# Patient Record
Sex: FEMALE | Race: WHITE | NOT HISPANIC OR LATINO | Employment: OTHER | URBAN - NONMETROPOLITAN AREA
[De-identification: names, ages, dates, MRNs, and addresses within clinical notes are randomized per-mention and may not be internally consistent; named-entity substitution may affect disease eponyms.]

---

## 2017-02-18 ENCOUNTER — HOSPITAL ENCOUNTER (EMERGENCY)
Facility: HOSPITAL | Age: 62
Discharge: STILL ADMITTED OR ADMINISTRATIVE | End: 2017-02-18
Attending: EMERGENCY MEDICINE | Admitting: EMERGENCY MEDICINE

## 2017-02-18 ENCOUNTER — HOSPITAL ENCOUNTER (INPATIENT)
Facility: HOSPITAL | Age: 62
LOS: 5 days | Discharge: HOME | End: 2017-02-23
Attending: PSYCHIATRY & NEUROLOGY | Admitting: PSYCHIATRY & NEUROLOGY

## 2017-02-18 VITALS
OXYGEN SATURATION: 97 % | DIASTOLIC BLOOD PRESSURE: 100 MMHG | BODY MASS INDEX: 39.56 KG/M2 | WEIGHT: 261 LBS | HEART RATE: 84 BPM | TEMPERATURE: 97.9 F | RESPIRATION RATE: 18 BRPM | SYSTOLIC BLOOD PRESSURE: 153 MMHG | HEIGHT: 68 IN

## 2017-02-18 DIAGNOSIS — R45.851 SUICIDAL IDEATION: ICD-10-CM

## 2017-02-18 DIAGNOSIS — F32.A DEPRESSION, UNSPECIFIED DEPRESSION TYPE: Primary | ICD-10-CM

## 2017-02-18 PROBLEM — F32.9 MDD (MAJOR DEPRESSIVE DISORDER): Status: ACTIVE | Noted: 2017-02-18

## 2017-02-18 LAB
ALBUMIN SERPL-MCNC: 5.1 G/DL (ref 3.4–4.8)
ALBUMIN/GLOB SERPL: 1.4 G/DL (ref 1.5–2.5)
ALP SERPL-CCNC: 98 U/L (ref 35–104)
ALT SERPL W P-5'-P-CCNC: 30 U/L (ref 10–36)
AMPHET+METHAMPHET UR QL: NEGATIVE
ANION GAP SERPL CALCULATED.3IONS-SCNC: 9.4 MMOL/L (ref 3.6–11.2)
AST SERPL-CCNC: 23 U/L (ref 10–30)
BACTERIA UR QL AUTO: ABNORMAL /HPF
BARBITURATES UR QL SCN: NEGATIVE
BASOPHILS # BLD AUTO: 0.07 10*3/MM3 (ref 0–0.3)
BASOPHILS NFR BLD AUTO: 0.4 % (ref 0–2)
BENZODIAZ UR QL SCN: POSITIVE
BILIRUB SERPL-MCNC: 0.9 MG/DL (ref 0.2–1.8)
BILIRUB UR QL STRIP: ABNORMAL
BUN BLD-MCNC: 12 MG/DL (ref 7–21)
BUN/CREAT SERPL: 21.8 (ref 7–25)
BUPRENORPHINE+NOR UR QL SCN: NEGATIVE
CALCIUM SPEC-SCNC: 10 MG/DL (ref 7.7–10)
CANNABINOIDS SERPL QL: NEGATIVE
CHLORIDE SERPL-SCNC: 103 MMOL/L (ref 99–112)
CLARITY UR: ABNORMAL
CO2 SERPL-SCNC: 25.6 MMOL/L (ref 24.3–31.9)
COCAINE UR QL: NEGATIVE
COD CRY URNS QL: ABNORMAL /HPF
COLOR UR: ABNORMAL
CREAT BLD-MCNC: 0.55 MG/DL (ref 0.43–1.29)
DEPRECATED RDW RBC AUTO: 46.5 FL (ref 37–54)
EOSINOPHIL # BLD AUTO: 0.26 10*3/MM3 (ref 0–0.7)
EOSINOPHIL NFR BLD AUTO: 1.6 % (ref 0–5)
ERYTHROCYTE [DISTWIDTH] IN BLOOD BY AUTOMATED COUNT: 14.7 % (ref 11.5–14.5)
ETHANOL BLD-MCNC: <10 MG/DL
ETHANOL UR QL: <0.01 %
GFR SERPL CREATININE-BSD FRML MDRD: 112 ML/MIN/1.73
GLOBULIN UR ELPH-MCNC: 3.7 GM/DL
GLUCOSE BLD-MCNC: 115 MG/DL (ref 70–110)
GLUCOSE UR STRIP-MCNC: NEGATIVE MG/DL
HCT VFR BLD AUTO: 45.1 % (ref 37–47)
HGB BLD-MCNC: 14.6 G/DL (ref 12–16)
HGB UR QL STRIP.AUTO: NEGATIVE
HYALINE CASTS UR QL AUTO: ABNORMAL /LPF
IMM GRANULOCYTES # BLD: 0.04 10*3/MM3 (ref 0–0.03)
IMM GRANULOCYTES NFR BLD: 0.2 % (ref 0–0.5)
KETONES UR QL STRIP: ABNORMAL
LEUKOCYTE ESTERASE UR QL STRIP.AUTO: ABNORMAL
LYMPHOCYTES # BLD AUTO: 3.54 10*3/MM3 (ref 1–3)
LYMPHOCYTES NFR BLD AUTO: 21.4 % (ref 21–51)
MCH RBC QN AUTO: 28.3 PG (ref 27–33)
MCHC RBC AUTO-ENTMCNC: 32.4 G/DL (ref 33–37)
MCV RBC AUTO: 87.6 FL (ref 80–94)
METHADONE UR QL SCN: NEGATIVE
MONOCYTES # BLD AUTO: 1.59 10*3/MM3 (ref 0.1–0.9)
MONOCYTES NFR BLD AUTO: 9.6 % (ref 0–10)
NEUTROPHILS # BLD AUTO: 11.01 10*3/MM3 (ref 1.4–6.5)
NEUTROPHILS NFR BLD AUTO: 66.8 % (ref 30–70)
NITRITE UR QL STRIP: NEGATIVE
OPIATES UR QL: NEGATIVE
OSMOLALITY SERPL CALC.SUM OF ELEC: 276.4 MOSM/KG (ref 273–305)
OXYCODONE UR QL SCN: NEGATIVE
PCP UR QL SCN: NEGATIVE
PH UR STRIP.AUTO: 5.5 [PH] (ref 5–8)
PLATELET # BLD AUTO: 457 10*3/MM3 (ref 130–400)
PMV BLD AUTO: 12 FL (ref 6–10)
POTASSIUM BLD-SCNC: 3.5 MMOL/L (ref 3.5–5.3)
PROPOXYPH UR QL: NEGATIVE
PROT SERPL-MCNC: 8.8 G/DL (ref 6–8)
PROT UR QL STRIP: ABNORMAL
RBC # BLD AUTO: 5.15 10*6/MM3 (ref 4.2–5.4)
RBC # UR: ABNORMAL /HPF
REF LAB TEST METHOD: ABNORMAL
SODIUM BLD-SCNC: 138 MMOL/L (ref 135–153)
SP GR UR STRIP: >1.03 (ref 1–1.03)
SQUAMOUS #/AREA URNS HPF: ABNORMAL /HPF
UROBILINOGEN UR QL STRIP: ABNORMAL
WBC NRBC COR # BLD: 16.51 10*3/MM3 (ref 4.5–12.5)
WBC UR QL AUTO: ABNORMAL /HPF

## 2017-02-18 RX ORDER — TRAZODONE HYDROCHLORIDE 50 MG/1
100 TABLET ORAL NIGHTLY PRN
Status: DISCONTINUED | OUTPATIENT
Start: 2017-02-18 | End: 2017-02-20

## 2017-02-18 RX ORDER — AMLODIPINE BESYLATE 10 MG/1
10 TABLET ORAL DAILY
Status: DISCONTINUED | OUTPATIENT
Start: 2017-02-19 | End: 2017-02-23 | Stop reason: HOSPADM

## 2017-02-18 RX ORDER — BENZTROPINE MESYLATE 1 MG/1
2 TABLET ORAL AS NEEDED
Status: DISCONTINUED | OUTPATIENT
Start: 2017-02-18 | End: 2017-02-23 | Stop reason: HOSPADM

## 2017-02-18 RX ORDER — METOPROLOL TARTRATE 100 MG/1
100 TABLET ORAL EVERY 12 HOURS SCHEDULED
Status: DISCONTINUED | OUTPATIENT
Start: 2017-02-19 | End: 2017-02-18 | Stop reason: SDUPTHER

## 2017-02-18 RX ORDER — LISINOPRIL 10 MG/1
20 TABLET ORAL DAILY
Status: CANCELLED | OUTPATIENT
Start: 2017-02-19

## 2017-02-18 RX ORDER — DICLOFENAC SODIUM 75 MG/1
75 TABLET, DELAYED RELEASE ORAL 2 TIMES DAILY
COMMUNITY

## 2017-02-18 RX ORDER — ONDANSETRON 4 MG/1
4 TABLET, FILM COATED ORAL EVERY 6 HOURS PRN
Status: DISCONTINUED | OUTPATIENT
Start: 2017-02-18 | End: 2017-02-23 | Stop reason: HOSPADM

## 2017-02-18 RX ORDER — NAPROXEN 250 MG/1
500 TABLET ORAL 2 TIMES DAILY WITH MEALS
Status: CANCELLED | OUTPATIENT
Start: 2017-02-18

## 2017-02-18 RX ORDER — NAPROXEN 250 MG/1
500 TABLET ORAL 2 TIMES DAILY WITH MEALS
Status: DISCONTINUED | OUTPATIENT
Start: 2017-02-19 | End: 2017-02-18 | Stop reason: SDUPTHER

## 2017-02-18 RX ORDER — LOPERAMIDE HYDROCHLORIDE 2 MG/1
2 CAPSULE ORAL 4 TIMES DAILY PRN
Status: DISCONTINUED | OUTPATIENT
Start: 2017-02-18 | End: 2017-02-23 | Stop reason: HOSPADM

## 2017-02-18 RX ORDER — ACETAMINOPHEN 325 MG/1
650 TABLET ORAL EVERY 6 HOURS PRN
Status: DISCONTINUED | OUTPATIENT
Start: 2017-02-18 | End: 2017-02-23 | Stop reason: HOSPADM

## 2017-02-18 RX ORDER — VENLAFAXINE 37.5 MG/1
75 TABLET ORAL 2 TIMES DAILY
Status: DISCONTINUED | OUTPATIENT
Start: 2017-02-19 | End: 2017-02-23 | Stop reason: HOSPADM

## 2017-02-18 RX ORDER — FLUTICASONE PROPIONATE 50 MCG
1 SPRAY, SUSPENSION (ML) NASAL DAILY
Status: CANCELLED | OUTPATIENT
Start: 2017-02-19

## 2017-02-18 RX ORDER — NAPROXEN 250 MG/1
500 TABLET ORAL 2 TIMES DAILY WITH MEALS
Status: DISCONTINUED | OUTPATIENT
Start: 2017-02-19 | End: 2017-02-23 | Stop reason: HOSPADM

## 2017-02-18 RX ORDER — METOPROLOL TARTRATE 50 MG/1
100 TABLET, FILM COATED ORAL 2 TIMES DAILY
Status: CANCELLED | OUTPATIENT
Start: 2017-02-18

## 2017-02-18 RX ORDER — AMLODIPINE BESYLATE 5 MG/1
10 TABLET ORAL DAILY
Status: CANCELLED | OUTPATIENT
Start: 2017-02-19

## 2017-02-18 RX ORDER — LISINOPRIL 20 MG/1
20 TABLET ORAL DAILY
COMMUNITY
End: 2020-06-30 | Stop reason: SDUPTHER

## 2017-02-18 RX ORDER — HYDROXYZINE 50 MG/1
50 TABLET, FILM COATED ORAL EVERY 6 HOURS PRN
Status: DISCONTINUED | OUTPATIENT
Start: 2017-02-18 | End: 2017-02-23 | Stop reason: HOSPADM

## 2017-02-18 RX ORDER — METOPROLOL TARTRATE 100 MG/1
100 TABLET ORAL 2 TIMES DAILY
COMMUNITY

## 2017-02-18 RX ORDER — FAMOTIDINE 20 MG/1
10 TABLET, FILM COATED ORAL 2 TIMES DAILY
Status: CANCELLED | OUTPATIENT
Start: 2017-02-18

## 2017-02-18 RX ORDER — QUETIAPINE FUMARATE 50 MG/1
100 TABLET, EXTENDED RELEASE ORAL NIGHTLY
COMMUNITY
End: 2017-02-23 | Stop reason: HOSPADM

## 2017-02-18 RX ORDER — AMLODIPINE BESYLATE 10 MG/1
10 TABLET ORAL DAILY
COMMUNITY
End: 2020-05-18 | Stop reason: SDUPTHER

## 2017-02-18 RX ORDER — FLUTICASONE PROPIONATE 50 MCG
1 SPRAY, SUSPENSION (ML) NASAL DAILY
Status: DISCONTINUED | OUTPATIENT
Start: 2017-02-19 | End: 2017-02-23 | Stop reason: HOSPADM

## 2017-02-18 RX ORDER — LISINOPRIL 10 MG/1
20 TABLET ORAL DAILY
Status: DISCONTINUED | OUTPATIENT
Start: 2017-02-19 | End: 2017-02-23 | Stop reason: HOSPADM

## 2017-02-18 RX ORDER — MAGNESIUM HYDROXIDE/ALUMINUM HYDROXICE/SIMETHICONE 120; 1200; 1200 MG/30ML; MG/30ML; MG/30ML
30 SUSPENSION ORAL EVERY 6 HOURS PRN
Status: DISCONTINUED | OUTPATIENT
Start: 2017-02-18 | End: 2017-02-21 | Stop reason: CLARIF

## 2017-02-18 RX ORDER — HYDROXYZINE HYDROCHLORIDE 25 MG/1
25 TABLET, FILM COATED ORAL 3 TIMES DAILY PRN
Status: DISCONTINUED | OUTPATIENT
Start: 2017-02-18 | End: 2017-02-18 | Stop reason: HOSPADM

## 2017-02-18 RX ORDER — QUETIAPINE FUMARATE 50 MG/1
100 TABLET, EXTENDED RELEASE ORAL NIGHTLY
Status: CANCELLED | OUTPATIENT
Start: 2017-02-18

## 2017-02-18 RX ORDER — VENLAFAXINE 37.5 MG/1
75 TABLET ORAL 2 TIMES DAILY
Status: CANCELLED | OUTPATIENT
Start: 2017-02-18

## 2017-02-18 RX ORDER — FAMOTIDINE 10 MG
10 TABLET ORAL 2 TIMES DAILY
COMMUNITY
End: 2020-05-18

## 2017-02-18 RX ORDER — FAMOTIDINE 20 MG/1
10 TABLET, FILM COATED ORAL 2 TIMES DAILY
Status: DISCONTINUED | OUTPATIENT
Start: 2017-02-19 | End: 2017-02-23 | Stop reason: HOSPADM

## 2017-02-18 RX ORDER — METOPROLOL TARTRATE 100 MG/1
100 TABLET ORAL EVERY 12 HOURS SCHEDULED
Status: DISCONTINUED | OUTPATIENT
Start: 2017-02-19 | End: 2017-02-23 | Stop reason: HOSPADM

## 2017-02-18 RX ORDER — IBUPROFEN 400 MG/1
400 TABLET ORAL EVERY 6 HOURS PRN
Status: DISCONTINUED | OUTPATIENT
Start: 2017-02-18 | End: 2017-02-23 | Stop reason: HOSPADM

## 2017-02-18 RX ORDER — BENZTROPINE MESYLATE 1 MG/ML
1 INJECTION INTRAMUSCULAR; INTRAVENOUS AS NEEDED
Status: DISCONTINUED | OUTPATIENT
Start: 2017-02-18 | End: 2017-02-23 | Stop reason: HOSPADM

## 2017-02-18 RX ORDER — VENLAFAXINE 75 MG/1
75 TABLET ORAL DAILY
COMMUNITY

## 2017-02-18 RX ORDER — QUETIAPINE FUMARATE 50 MG/1
100 TABLET, EXTENDED RELEASE ORAL NIGHTLY
Status: DISCONTINUED | OUTPATIENT
Start: 2017-02-19 | End: 2017-02-19

## 2017-02-18 RX ORDER — FLUTICASONE PROPIONATE 50 MCG
1 SPRAY, SUSPENSION (ML) NASAL DAILY
COMMUNITY

## 2017-02-18 RX ADMIN — ACETAMINOPHEN 650 MG: 325 TABLET, FILM COATED ORAL at 22:37

## 2017-02-18 RX ADMIN — HYDROXYZINE 25 MG: 25 TABLET, FILM COATED ORAL at 20:30

## 2017-02-18 RX ADMIN — TRAZODONE HYDROCHLORIDE 100 MG: 50 TABLET ORAL at 22:37

## 2017-02-18 RX ADMIN — HYDROXYZINE HYDROCHLORIDE 50 MG: 50 TABLET ORAL at 22:37

## 2017-02-19 PROCEDURE — 99223 1ST HOSP IP/OBS HIGH 75: CPT | Performed by: PSYCHIATRY & NEUROLOGY

## 2017-02-19 RX ORDER — LURASIDONE HYDROCHLORIDE 40 MG/1
40 TABLET, FILM COATED ORAL DAILY
Status: DISCONTINUED | OUTPATIENT
Start: 2017-02-19 | End: 2017-02-20

## 2017-02-19 RX ORDER — NITROFURANTOIN 25; 75 MG/1; MG/1
100 CAPSULE ORAL EVERY 12 HOURS SCHEDULED
Status: COMPLETED | OUTPATIENT
Start: 2017-02-19 | End: 2017-02-23

## 2017-02-19 RX ADMIN — METOPROLOL TARTRATE 100 MG: 100 TABLET, FILM COATED ORAL at 08:10

## 2017-02-19 RX ADMIN — FAMOTIDINE 10 MG: 20 TABLET, FILM COATED ORAL at 18:10

## 2017-02-19 RX ADMIN — NAPROXEN 500 MG: 250 TABLET ORAL at 08:09

## 2017-02-19 RX ADMIN — AMLODIPINE BESYLATE 10 MG: 10 TABLET ORAL at 08:10

## 2017-02-19 RX ADMIN — FLUTICASONE PROPIONATE 1 SPRAY: 50 SPRAY, METERED NASAL at 08:12

## 2017-02-19 RX ADMIN — HYDROXYZINE HYDROCHLORIDE 50 MG: 50 TABLET ORAL at 06:34

## 2017-02-19 RX ADMIN — HYDROXYZINE HYDROCHLORIDE 50 MG: 50 TABLET ORAL at 12:13

## 2017-02-19 RX ADMIN — LURASIDONE HYDROCHLORIDE 40 MG: 40 TABLET, FILM COATED ORAL at 18:11

## 2017-02-19 RX ADMIN — HYDROXYZINE HYDROCHLORIDE 50 MG: 50 TABLET ORAL at 18:13

## 2017-02-19 RX ADMIN — VENLAFAXINE HYDROCHLORIDE 75 MG: 37.5 TABLET ORAL at 18:10

## 2017-02-19 RX ADMIN — VENLAFAXINE HYDROCHLORIDE 75 MG: 37.5 TABLET ORAL at 08:09

## 2017-02-19 RX ADMIN — IBUPROFEN 400 MG: 400 TABLET ORAL at 20:54

## 2017-02-19 RX ADMIN — NITROFURANTOIN MONOHYDRATE/MACROCRYSTALLINE 100 MG: 25; 75 CAPSULE ORAL at 18:10

## 2017-02-19 RX ADMIN — TRAZODONE HYDROCHLORIDE 100 MG: 50 TABLET ORAL at 20:53

## 2017-02-19 RX ADMIN — IBUPROFEN 400 MG: 400 TABLET ORAL at 12:13

## 2017-02-19 RX ADMIN — NAPROXEN 500 MG: 250 TABLET ORAL at 18:10

## 2017-02-19 RX ADMIN — FAMOTIDINE 10 MG: 20 TABLET, FILM COATED ORAL at 08:10

## 2017-02-19 RX ADMIN — LISINOPRIL 20 MG: 10 TABLET ORAL at 08:10

## 2017-02-19 RX ADMIN — METOPROLOL TARTRATE 100 MG: 100 TABLET, FILM COATED ORAL at 20:54

## 2017-02-19 RX ADMIN — ACETAMINOPHEN 650 MG: 325 TABLET, FILM COATED ORAL at 05:56
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
## 2017-02-19 NOTE — PLAN OF CARE
Problem:  Patient Care Overview (Adult)  Goal: Plan of Care Review  Outcome: Ongoing (interventions implemented as appropriate)    02/19/17 1832   Coping/Psychosocial Response Interventions   Plan Of Care Reviewed With patient   Coping/Psychosocial   Patient Agreement with Plan of Care agrees   Patient Care Overview   Progress no change   Outcome Evaluation   Outcome Summary/Follow up Plan Patient has been cooperative with staff. Pt rates anxiety and depression 7/10. Pt clearly anxious and labile with periods of crying. Pt denies SI at this time. Continue to monitor.         Problem:  Overarching Goals  Goal: Adheres to Safety Considerations for Self and Others  Outcome: Ongoing (interventions implemented as appropriate)  Goal: Optimized Coping Skills in Response to Life Stressors  Outcome: Ongoing (interventions implemented as appropriate)  Goal: Develops/Participates in Therapeutic [place] to Support Successful Transition  Outcome: Ongoing (interventions implemented as appropriate)

## 2017-02-19 NOTE — ED PROVIDER NOTES
Subjective   Patient is a 61 y.o. female presenting with mental health disorder.   History provided by:  Patient   used: No    Mental Health Problem   Presenting symptoms: depression, suicidal thoughts and suicidal threats    Presenting symptoms: no aggressive behavior, no agitation, no bizarre behavior, no delusions, no disorganized speech, no disorganized thought process, no hallucinations, no homicidal ideas, no paranoid behavior, no self-mutilation and no suicide attempt    Onset quality:  Gradual  Timing:  Constant  Progression:  Worsening  Chronicity:  Recurrent  Context: not alcohol use, not drug abuse, not medication, not noncompliant, not recent medication change and not stressful life event    Treatment compliance:  Untreated  Relieved by:  Nothing  Worsened by:  Nothing  Ineffective treatments:  None tried  Associated symptoms: anxiety    Associated symptoms: no abdominal pain, no anhedonia, no appetite change, no chest pain, no decreased need for sleep, not distractible, no euphoric mood, no fatigue, no feelings of worthlessness, no headaches, no hypersomnia, no hyperventilation, no insomnia, no irritability, no poor judgment, no school problems and no weight change    Risk factors: no family hx of mental illness, no family violence, no hx of mental illness, no hx of suicide attempts, no neurological disease and no recent psychiatric admission        Review of Systems   Constitutional: Negative for appetite change, fatigue and irritability.   Cardiovascular: Negative for chest pain.   Gastrointestinal: Negative for abdominal pain.   Neurological: Negative for headaches.   Psychiatric/Behavioral: Positive for suicidal ideas. Negative for agitation, hallucinations, homicidal ideas, paranoia and self-injury. The patient is nervous/anxious. The patient does not have insomnia.    All other systems reviewed and are negative.      No past medical history on file.    Allergies   Allergen  Reactions   • Sulfa Antibiotics        No past surgical history on file.    No family history on file.    Social History     Social History   • Marital status: N/A     Spouse name: N/A   • Number of children: N/A   • Years of education: N/A     Social History Main Topics   • Smoking status: Not on file   • Smokeless tobacco: Not on file   • Alcohol use Not on file   • Drug use: Not on file   • Sexual activity: Not on file     Other Topics Concern   • Not on file     Social History Narrative           Objective   Physical Exam   Constitutional: She is oriented to person, place, and time. Vital signs are normal. She appears well-developed and well-nourished.   HENT:   Head: Normocephalic and atraumatic.   Right Ear: External ear normal.   Left Ear: External ear normal.   Nose: Nose normal.   Mouth/Throat: Oropharynx is clear and moist.   Eyes: EOM are normal. Pupils are equal, round, and reactive to light.   Neck: Normal range of motion.   Cardiovascular: Normal rate and regular rhythm.    Pulmonary/Chest: Effort normal and breath sounds normal.   Abdominal: Soft.   Neurological: She is oriented to person, place, and time.   Psychiatric: Her speech is normal and behavior is normal. Judgment normal. Her mood appears anxious. She is not actively hallucinating. Cognition and memory are normal. She exhibits a depressed mood. She expresses suicidal ideation. She is attentive.   Nursing note and vitals reviewed.      Procedures         ED Course  ED Course                  MDM  Number of Diagnoses or Management Options  Depression, unspecified depression type: established and worsening  Suicidal ideation: established and worsening     Amount and/or Complexity of Data Reviewed  Clinical lab tests: ordered    Risk of Complications, Morbidity, and/or Mortality  Presenting problems: moderate  Diagnostic procedures: moderate  Management options: moderate    Patient Progress  Patient progress: stable      Final diagnoses:    Depression, unspecified depression type   Suicidal ideation            JULIANE Spencer  02/18/17 1943

## 2017-02-19 NOTE — PLAN OF CARE
Problem:  Patient Care Overview (Adult)  Goal: Individualization and Mutuality  Outcome: Ongoing (interventions implemented as appropriate)    02/18/17 2210 02/19/17 1846   Behavioral Health Screens   Patient Personal Strengths --  motivated for recovery;motivated for treatment;intellectual cognitive skills;resourceful;stable living environment;spiritual/Jain support;expressive of needs;expressive of emotions;family/social support   Patient Personal Strengths Comment --  willing to seek help    Patient Vulnerabilities --  ineffective coping and grief issues    Individualization   Patient Specific Preferences --  none   Patient Specific Goals --  to get better    Patient Specific Interventions --  Individual and group therapy to focus on healthy coping and schedule follow up care   Mutuality/Individual Preferences   What Anxieties, Fears or Concerns Do You Have About Your Health or Care? anxiety about being admitted to psych [facility]- worry about outcome of treatment and if she can get better. --    What Questions Do You Have About Your Health or Care? reviewed with pt unit routine and routine orders --    What Information Would Help Us Give You More Personalized Care? pt reports that she falls frequently- uses cane at home at times- L knee replacement about 4 months ago --        Goal: Discharge Needs Assessment  Outcome: Ongoing (interventions implemented as appropriate)    02/18/17 2208 02/19/17 1846   Discharge Needs Assessment   Concerns To Be Addressed --  mental health concerns   Readmission Within The Last 30 Days --  no previous admission in last 30 days   Community Agency Name(S) --  Pt sees Dr. Vega in [place]    Current Discharge Risk --  physical impairment;psychiatric illness  (grief issues death of  in June)   Discharge Planning Comments --  pt is able to afford her medications    Discharge Needs Assessment   Outpatient/Agency/Support Group Needs --  outpatient psychiatric care  (specify);outpatient counseling;outpatient medication management   Anticipated Discharge Disposition --  home with family   Discharge Disposition --  home with family   Living Environment   Transportation Available family or friend will provide --       Met with patient and treatment team for initial assessment and treatment planning. Introduced self as assigned therapist she was agreeable. Completed PSA treatment plan and integrated summary. Discussed treatment objectives and briefly discussed disposition plans.      The patient reports increased depression and anxiety with thoughts of suicide with no plan. Reports feeling like she is  having a nervous breakdown. Patients   in  of cancer. Patients medications have been changed several times in the past several months her PCP closed his practice and she has seen a APRN that now has stopped her practice. She was referred to a pain clinic and she cant afford it. She has been off opiates for over one month and has chronic pain. She lives with her daughter and son in law and helps pay their bills and does not feel like they want her there. Chart information says her   7 yrs ago. Patient was tearful and crying during assessment and at times difficult to understand. She reports feeling hopeless and helpless and difficulty coping with chronic pain. She has cut her wrist in the past and thought about hanging herself. She has lost 28 lbs in a month.      Treatment team to stabilize patients symptoms, provide 24/7 nursing supervision and provide illness education and schedule follow up care. She is seen by Dr. Vega in [place]. She was encouraged to involve her family in her treatment

## 2017-02-20 PROBLEM — F33.2 MAJOR DEPRESSIVE DISORDER, RECURRENT, SEVERE WITHOUT PSYCHOTIC FEATURES (HCC): Status: ACTIVE | Noted: 2017-02-18

## 2017-02-20 PROCEDURE — 99232 SBSQ HOSP IP/OBS MODERATE 35: CPT

## 2017-02-20 RX ORDER — ZIPRASIDONE HYDROCHLORIDE 20 MG/1
20 CAPSULE ORAL 2 TIMES DAILY WITH MEALS
Status: DISCONTINUED | OUTPATIENT
Start: 2017-02-20 | End: 2017-02-23 | Stop reason: HOSPADM

## 2017-02-20 RX ORDER — MIRTAZAPINE 15 MG/1
15 TABLET, FILM COATED ORAL NIGHTLY
Status: DISCONTINUED | OUTPATIENT
Start: 2017-02-20 | End: 2017-02-23 | Stop reason: HOSPADM

## 2017-02-20 RX ADMIN — ACETAMINOPHEN 650 MG: 325 TABLET, FILM COATED ORAL at 12:51

## 2017-02-20 RX ADMIN — HYDROXYZINE HYDROCHLORIDE 50 MG: 50 TABLET ORAL at 21:02

## 2017-02-20 RX ADMIN — NITROFURANTOIN MONOHYDRATE/MACROCRYSTALLINE 100 MG: 25; 75 CAPSULE ORAL at 21:02

## 2017-02-20 RX ADMIN — METOPROLOL TARTRATE 100 MG: 100 TABLET, FILM COATED ORAL at 08:18

## 2017-02-20 RX ADMIN — FAMOTIDINE 10 MG: 20 TABLET, FILM COATED ORAL at 17:20

## 2017-02-20 RX ADMIN — VENLAFAXINE HYDROCHLORIDE 75 MG: 37.5 TABLET ORAL at 08:18

## 2017-02-20 RX ADMIN — METOPROLOL TARTRATE 100 MG: 100 TABLET, FILM COATED ORAL at 21:02

## 2017-02-20 RX ADMIN — HYDROXYZINE HYDROCHLORIDE 50 MG: 50 TABLET ORAL at 05:26

## 2017-02-20 RX ADMIN — LURASIDONE HYDROCHLORIDE 40 MG: 40 TABLET, FILM COATED ORAL at 08:18

## 2017-02-20 RX ADMIN — FAMOTIDINE 10 MG: 20 TABLET, FILM COATED ORAL at 08:18

## 2017-02-20 RX ADMIN — FLUTICASONE PROPIONATE 1 SPRAY: 50 SPRAY, METERED NASAL at 08:16

## 2017-02-20 RX ADMIN — LISINOPRIL 20 MG: 10 TABLET ORAL at 08:18

## 2017-02-20 RX ADMIN — NAPROXEN 500 MG: 250 TABLET ORAL at 08:17

## 2017-02-20 RX ADMIN — ZIPRASIDONE HYDROCHLORIDE 20 MG: 20 CAPSULE ORAL at 17:20

## 2017-02-20 RX ADMIN — VENLAFAXINE HYDROCHLORIDE 75 MG: 37.5 TABLET ORAL at 17:21

## 2017-02-20 RX ADMIN — NITROFURANTOIN MONOHYDRATE/MACROCRYSTALLINE 100 MG: 25; 75 CAPSULE ORAL at 08:18

## 2017-02-20 RX ADMIN — NAPROXEN 500 MG: 250 TABLET ORAL at 17:21

## 2017-02-20 RX ADMIN — MIRTAZAPINE 15 MG: 15 TABLET, FILM COATED ORAL at 21:02

## 2017-02-20 RX ADMIN — HYDROXYZINE HYDROCHLORIDE 50 MG: 50 TABLET ORAL at 12:51

## 2017-02-20 RX ADMIN — ACETAMINOPHEN 650 MG: 325 TABLET, FILM COATED ORAL at 05:26

## 2017-02-20 RX ADMIN — AMLODIPINE BESYLATE 10 MG: 10 TABLET ORAL at 08:17

## 2017-02-20 NOTE — PLAN OF CARE
"Problem:  Patient Care Overview (Adult)  Goal: Interdisciplinary Rounds/Family Conference    02/20/17 1109   Interdisciplinary Rounds/Family Conf   Participants psychiatrist;patient;social work      [id number]     DATA:       Covering therapist met individually with patient this date.  Discussed progress in treatment and any needs/concerns. Patient agreeable.  Patient reports that she is here for overwhelming feelings of depression and anxiety.  Therapist spent some time with patient providing reflective listening and emotional support this date.  Therapist recommended that patient also seek counseling in addition to Dr. Hillman.  She was not receptive as she can not afford copays for [facility] and [facility] at this time.  She does have history of completing 2 years of Hospice counseling.   She plans to possibly return to therapy groups there.       Therapist asked for consent to speak with patient's daughter Court Wild at [phone].  No answer this date.          ASSESSMENT:      Patient observed to have dysphoric affect and mood. Patient rating depression at 5/10 and anxiety at 7/10. Patient reports that her brain feels \"like it's swimming.\"  She states that this has been going for a couple months. Patient speaks a lot about depression, grief, and feeling overwhelmed.  She cries through out session.  She is receptive to emotional support.       Plan:     Treatment team to stabilize patients symptoms, provide 24/7 nursing supervision and provide illness education and schedule follow up care. She has signed consents for Dr. Hillman. She plans to return home via family at discharge.        "

## 2017-02-20 NOTE — PROGRESS NOTES
Subjective   Arlette Felder is a 61 y.o. female     Hospital Day #2    Chief Complaint:  Depression    HPI:  History of Present Illness  Still very depressed today, doesn't care she lives or dies. Doesn't feel like the Latuda has done much of anything for her. Trazodone doesn't help with sleep. Effexor has been helpful for her mood in the past.    Anxiety rating 7/10  Depression rating 7/10  Sleep: Restless      Review of Systems   Constitutional: Positive for fatigue.   HENT: Negative for hearing loss.    Respiratory: Negative for shortness of breath.    Cardiovascular: Negative for chest pain.   Gastrointestinal: Negative for nausea.   Musculoskeletal: Positive for myalgias. Negative for neck stiffness.   Neurological: Negative for tremors.   All other systems reviewed and are negative.      Objective   Physical Exam   Constitutional: She appears well-developed and well-nourished. No distress.   Neurological: She is alert. Coordination and gait normal.   Vitals reviewed.      Wt Readings from Last 3 Encounters:   02/18/17 259 lb (117 kg)   02/18/17 261 lb (118 kg)     Temp Readings from Last 3 Encounters:   02/20/17 97 °F (36.1 °C) (Tympanic)   02/18/17 97.9 °F (36.6 °C) (Oral)     BP Readings from Last 3 Encounters:   02/20/17 131/84   02/18/17 153/100     Pulse Readings from Last 3 Encounters:   02/20/17 88   02/18/17 84       Allergies   Allergen Reactions   • Sulfa Antibiotics Itching       Lab Results (last 24 hours)     ** No results found for the last 24 hours. **          Imaging Results (last 24 hours)     ** No results found for the last 24 hours. **          Current Medications:     amLODIPine 10 mg Oral Daily   famotidine 10 mg Oral BID   fluticasone 1 spray Each Nare Daily   lisinopril 20 mg Oral Daily   metoprolol tartrate 100 mg Oral Q12H   mirtazapine 15 mg Oral Nightly   naproxen 500 mg Oral BID With Meals   nitrofurantoin (macrocrystal-monohydrate) 100 mg Oral Q12H   venlafaxine 75 mg Oral BID  "  ziprasidone 20 mg Oral BID With Meals     •  acetaminophen  •  aluminum-magnesium hydroxide-simethicone  •  benztropine **OR** benztropine  •  hydrOXYzine  •  ibuprofen  •  loperamide  •  magnesium hydroxide  •  ondansetron      Mental Status Exam:   Appearance: Somewhat disheveled, wearing hospital gowns   Cooperation: Cooperative  Orientation: Ox4  Gait and station stable.   Psychomotor: No psychomotor agitation/retardation, No EPS, No motor tics  Speech: normal rate, amount.  Language: intact  Fund of Knowledge: average  Mood \"depressed\"   Affect: Tearful  Associations: intact  Thought Content: goal directed, no delusional material present  Thought process: linear, organized.  Suicidality: +SI  Homicidality: Denies HI  Perception: Denies AH/VH  Memory is intact for recent and remote events  Attention/Concentration: good  Impulse control: good  Insight: fair   Judgement: fair    Special Precaution Level: Continue current level of special precautions    Assessment/Plan:   Assessment/Plan   Patient Active Problem List   Diagnosis Code   • Major depressive disorder, recurrent, severe without psychotic features F33.2       · Continue hospitalization for safety and stabilization. We'll discontinue the Latuda and replace it with Geodon 20 mg twice daily for mood stabilization, which may need to be adjusted over the next couple of days. Continue Effexor 75 mg twice daily for mood and anxiety. Start Remeron in place of trazodone for sleep    ·     We discussed risks, benefits, and side effects of the above medication and the patient was agreeable with the plan. I have reviewed the treatment plan and agree with current plan.  Treatment was discussed with the patient who is agreeable to this treatment and plan.           "

## 2017-02-20 NOTE — PLAN OF CARE
Problem:  Patient Care Overview (Adult)  Goal: Plan of Care Review    02/20/17 0546   Outcome Evaluation   Outcome Summary/Follow up Plan Patient tanya KATIA and HI. Pt participates in dayrooom activities.

## 2017-02-20 NOTE — DISCHARGE INSTR - APPOINTMENTS
Dr. Vega   [address] [place] [id number]  [phone]    March 4th, 2017 @ 1:30     Please bring your discharge papers to this appointment.

## 2017-02-20 NOTE — PLAN OF CARE
Problem: [facility] Patient Care Overview (Adult)  Goal: Plan of Care Review    02/20/17 0326   Coping/Psychosocial Response Interventions   Plan Of Care Reviewed With patient   Coping/Psychosocial   Patient Agreement with Plan of Care agrees   Patient Care Overview   Progress improving   Outcome Evaluation   Outcome Summary/Follow up Plan rates anxiety and depression a 7, denies si/hi, deneis hallucinations, reports feeling a little better.

## 2017-02-21 LAB — BACTERIA SPEC AEROBE CULT: NORMAL

## 2017-02-21 PROCEDURE — 99232 SBSQ HOSP IP/OBS MODERATE 35: CPT

## 2017-02-21 RX ORDER — ALUMINA, MAGNESIA, AND SIMETHICONE 2400; 2400; 240 MG/30ML; MG/30ML; MG/30ML
15 SUSPENSION ORAL EVERY 6 HOURS PRN
Status: DISCONTINUED | OUTPATIENT
Start: 2017-02-21 | End: 2017-02-23 | Stop reason: HOSPADM

## 2017-02-21 RX ADMIN — METOPROLOL TARTRATE 100 MG: 100 TABLET, FILM COATED ORAL at 20:48

## 2017-02-21 RX ADMIN — VENLAFAXINE HYDROCHLORIDE 75 MG: 37.5 TABLET ORAL at 08:25

## 2017-02-21 RX ADMIN — ACETAMINOPHEN 650 MG: 325 TABLET, FILM COATED ORAL at 14:02

## 2017-02-21 RX ADMIN — ZIPRASIDONE HYDROCHLORIDE 20 MG: 20 CAPSULE ORAL at 17:01

## 2017-02-21 RX ADMIN — METOPROLOL TARTRATE 100 MG: 100 TABLET, FILM COATED ORAL at 08:25

## 2017-02-21 RX ADMIN — AMLODIPINE BESYLATE 10 MG: 10 TABLET ORAL at 08:24

## 2017-02-21 RX ADMIN — HYDROXYZINE HYDROCHLORIDE 50 MG: 50 TABLET ORAL at 16:30

## 2017-02-21 RX ADMIN — NAPROXEN 500 MG: 250 TABLET ORAL at 17:01

## 2017-02-21 RX ADMIN — FAMOTIDINE 10 MG: 20 TABLET, FILM COATED ORAL at 08:24

## 2017-02-21 RX ADMIN — HYDROXYZINE HYDROCHLORIDE 50 MG: 50 TABLET ORAL at 08:23

## 2017-02-21 RX ADMIN — ZIPRASIDONE HYDROCHLORIDE 20 MG: 20 CAPSULE ORAL at 08:24

## 2017-02-21 RX ADMIN — FAMOTIDINE 10 MG: 20 TABLET, FILM COATED ORAL at 17:01

## 2017-02-21 RX ADMIN — IBUPROFEN 400 MG: 400 TABLET ORAL at 08:23

## 2017-02-21 RX ADMIN — NITROFURANTOIN MONOHYDRATE/MACROCRYSTALLINE 100 MG: 25; 75 CAPSULE ORAL at 20:48

## 2017-02-21 RX ADMIN — NITROFURANTOIN MONOHYDRATE/MACROCRYSTALLINE 100 MG: 25; 75 CAPSULE ORAL at 08:25

## 2017-02-21 RX ADMIN — VENLAFAXINE HYDROCHLORIDE 75 MG: 37.5 TABLET ORAL at 17:01

## 2017-02-21 RX ADMIN — LISINOPRIL 20 MG: 10 TABLET ORAL at 08:25

## 2017-02-21 RX ADMIN — NAPROXEN 500 MG: 250 TABLET ORAL at 08:24

## 2017-02-21 RX ADMIN — MIRTAZAPINE 15 MG: 15 TABLET, FILM COATED ORAL at 20:48

## 2017-02-21 RX ADMIN — FLUTICASONE PROPIONATE 1 SPRAY: 50 SPRAY, METERED NASAL at 08:23

## 2017-02-21 NOTE — PROGRESS NOTES
Subjective   Arlette Felder is a 61 y.o. female     Hospital Day #3    Chief Complaint:  Depression    HPI:  History of Present Illness  Still very depressed today, she says she has noticed some mild improvements after the medication changes yesterday.  She slept much better on the Remeron last night.  She feels a little calmer today and slightly more hopeful after Latuda was replaced by Geodon yesterday.    Anxiety rating 6/10  Depression rating 6/10  Sleep: Better on Remeron      Review of Systems   Constitutional: Positive for fatigue.   HENT: Negative for hearing loss.    Respiratory: Negative for shortness of breath.    Cardiovascular: Negative for chest pain.   Gastrointestinal: Negative for nausea.   Musculoskeletal: Positive for myalgias. Negative for neck stiffness.   Neurological: Negative for tremors.   All other systems reviewed and are negative.      Objective   Physical Exam   Constitutional: She appears well-developed and well-nourished. No distress.   Neurological: She is alert. Coordination and gait normal.   Vitals reviewed.      Wt Readings from Last 3 Encounters:   02/18/17 259 lb (117 kg)   02/18/17 261 lb (118 kg)     Temp Readings from Last 3 Encounters:   02/21/17 98.3 °F (36.8 °C) (Temporal Artery )   02/18/17 97.9 °F (36.6 °C) (Oral)     BP Readings from Last 3 Encounters:   02/21/17 153/87   02/18/17 153/100     Pulse Readings from Last 3 Encounters:   02/21/17 85   02/18/17 84       Allergies   Allergen Reactions   • Sulfa Antibiotics Itching       Lab Results (last 24 hours)     ** No results found for the last 24 hours. **          Imaging Results (last 24 hours)     ** No results found for the last 24 hours. **          Current Medications:     amLODIPine 10 mg Oral Daily   famotidine 10 mg Oral BID   fluticasone 1 spray Each Nare Daily   lisinopril 20 mg Oral Daily   metoprolol tartrate 100 mg Oral Q12H   mirtazapine 15 mg Oral Nightly   naproxen 500 mg Oral BID With Meals  "  nitrofurantoin (macrocrystal-monohydrate) 100 mg Oral Q12H   venlafaxine 75 mg Oral BID   ziprasidone 20 mg Oral BID With Meals     •  acetaminophen  •  aluminum-magnesium hydroxide-simethicone  •  benztropine **OR** benztropine  •  hydrOXYzine  •  ibuprofen  •  loperamide  •  magnesium hydroxide  •  ondansetron      Mental Status Exam:   Appearance: Somewhat disheveled, wearing hospital gowns   Cooperation: Cooperative  Orientation: Ox4  Gait and station stable.   Psychomotor: No psychomotor agitation/retardation, No EPS, No motor tics  Speech: normal rate, amount.  Language: intact  Fund of Knowledge: average  Mood \"depressed\"   Affect: Tearful  Associations: intact  Thought Content: goal directed, no delusional material present  Thought process: linear, organized.  Suicidality: denies SI today  Homicidality: Denies HI  Perception: Denies AH/VH  Memory is intact for recent and remote events  Attention/Concentration: good  Impulse control: good  Insight: fair   Judgement: fair    Special Precaution Level: Continue current level of special precautions    Assessment/Plan:   Assessment/Plan   Patient Active Problem List   Diagnosis Code   • Major depressive disorder, recurrent, severe without psychotic features F33.2       · Continue hospitalization for safety and stabilization. We started Geodon 20 mg on 2/20/17 twice daily for mood stabilization, which may need to be adjusted over the next couple of days. Continue Effexor 75 mg twice daily for mood and anxiety. Continue Remeron in place of trazodone for sleep    ·     We discussed risks, benefits, and side effects of the above medication and the patient was agreeable with the plan. I have reviewed the treatment plan and agree with current plan.  Treatment was discussed with the patient who is agreeable to this treatment and plan.           "

## 2017-02-21 NOTE — PLAN OF CARE
"Problem:  Patient Care Overview (Adult)  Goal: Interdisciplinary Rounds/Family Conference  Outcome: Ongoing (interventions implemented as appropriate)    02/21/17 1503   Interdisciplinary Rounds/Family Conf   Summary Treatment plan staffing and evaluation    Participants psychiatrist;social work;patient         [id number]:      DATA:       Therapist met individually with patient this date. Reintroduced self to patient from initial assessment began yesterday.  Discussed progress in treatment and any needs/concerns. Patient in room resting and agreeable.  Therapist continues to recommend involvement of daughter Court. Reviewed attempts to call her again today without success.  She reports, \"No matter to involve her anyway.  She hates me. What's the use?\"  Patient continues to become tearful when she talks about her family strain. Still, she does not want to confront any of her feelings with her daughter. She reports that her daughter attends school and work and will not be of \"any help with this.\"         ASSESSMENT:      Patient observed to have tearful affect and mood.  She continues to be tearful and depressed but notes some improvement since hospitalization.  Dr. Vasquez plans to adjust medications over the next couple of days and patient is agreeable.  Patient appears to have past history of family dysfunction and self harm. Possibly impacted by Borderline personality traits. She does report that hospitalization has been helpful and that she would seek 911 or the nearest Emergency Room before harming herself again.  She reports that will comply with Dr. Vega, but continues to report that she can not afford co-pays for therapy.  She is receptive to attending free grief therapy groups at her local Hospice as this has been helpful in the past.      Plan:        Treatment team to stabilize patients symptoms, provide 24/7 nursing supervision and provide illness education and schedule follow up care. She has signed " consents for Dr. Vega. Aftercare scheduled. She plans to return home at discharge. May required public transit as we have been unable to reach her daughter for planning.

## 2017-02-21 NOTE — PLAN OF CARE
Problem: [facility] Patient Care Overview (Adult)  Goal: Plan of Care Review  Outcome: Ongoing (interventions implemented as appropriate)    02/21/17 0603   Coping/Psychosocial Response Interventions   Plan Of Care Reviewed With patient   Coping/Psychosocial   Patient Agreement with Plan of Care agrees   Patient Care Overview   Progress improving   Outcome Evaluation   Outcome Summary/Follow up Plan reports she is feeling better, trying to make some changes in her life, encouraged to talk with her therapist.

## 2017-02-21 NOTE — PLAN OF CARE
Problem: BH Patient Care Overview (Adult)  Goal: Plan of Care Review  Outcome: Ongoing (interventions implemented as appropriate)    02/21/17 1644   Coping/Psychosocial Response Interventions   Plan Of Care Reviewed With patient   Coping/Psychosocial   Patient Agreement with Plan of Care agrees   Patient Care Overview   Progress no change   Outcome Evaluation   Outcome Summary/Follow up Plan Patient socalizes with other patients. Pateint participates in dayroom activities.

## 2017-02-22 PROCEDURE — 99231 SBSQ HOSP IP/OBS SF/LOW 25: CPT

## 2017-02-22 RX ADMIN — NITROFURANTOIN MONOHYDRATE/MACROCRYSTALLINE 100 MG: 25; 75 CAPSULE ORAL at 08:23

## 2017-02-22 RX ADMIN — IBUPROFEN 400 MG: 400 TABLET ORAL at 14:22

## 2017-02-22 RX ADMIN — NITROFURANTOIN MONOHYDRATE/MACROCRYSTALLINE 100 MG: 25; 75 CAPSULE ORAL at 20:21

## 2017-02-22 RX ADMIN — NAPROXEN 500 MG: 250 TABLET ORAL at 08:22

## 2017-02-22 RX ADMIN — VENLAFAXINE HYDROCHLORIDE 75 MG: 37.5 TABLET ORAL at 08:23

## 2017-02-22 RX ADMIN — HYDROXYZINE HYDROCHLORIDE 50 MG: 50 TABLET ORAL at 14:23

## 2017-02-22 RX ADMIN — FAMOTIDINE 10 MG: 20 TABLET, FILM COATED ORAL at 08:23

## 2017-02-22 RX ADMIN — METOPROLOL TARTRATE 100 MG: 100 TABLET, FILM COATED ORAL at 08:23

## 2017-02-22 RX ADMIN — ZIPRASIDONE HYDROCHLORIDE 20 MG: 20 CAPSULE ORAL at 17:32

## 2017-02-22 RX ADMIN — FAMOTIDINE 10 MG: 20 TABLET, FILM COATED ORAL at 17:31

## 2017-02-22 RX ADMIN — FLUTICASONE PROPIONATE 1 SPRAY: 50 SPRAY, METERED NASAL at 08:25

## 2017-02-22 RX ADMIN — AMLODIPINE BESYLATE 10 MG: 10 TABLET ORAL at 08:22

## 2017-02-22 RX ADMIN — ACETAMINOPHEN 650 MG: 325 TABLET, FILM COATED ORAL at 05:26

## 2017-02-22 RX ADMIN — METOPROLOL TARTRATE 100 MG: 100 TABLET, FILM COATED ORAL at 20:21

## 2017-02-22 RX ADMIN — LISINOPRIL 20 MG: 10 TABLET ORAL at 08:22

## 2017-02-22 RX ADMIN — ZIPRASIDONE HYDROCHLORIDE 20 MG: 20 CAPSULE ORAL at 08:22

## 2017-02-22 RX ADMIN — HYDROXYZINE HYDROCHLORIDE 50 MG: 50 TABLET ORAL at 05:26

## 2017-02-22 RX ADMIN — VENLAFAXINE HYDROCHLORIDE 75 MG: 37.5 TABLET ORAL at 17:32

## 2017-02-22 RX ADMIN — MIRTAZAPINE 15 MG: 15 TABLET, FILM COATED ORAL at 20:22

## 2017-02-22 RX ADMIN — NAPROXEN 500 MG: 250 TABLET ORAL at 17:31

## 2017-02-22 NOTE — PLAN OF CARE
Problem: BH Patient Care Overview (Adult)  Goal: Plan of Care Review  Outcome: Ongoing (interventions implemented as appropriate)    02/22/17 0523   Coping/Psychosocial Response Interventions   Plan Of Care Reviewed With patient   Coping/Psychosocial   Patient Agreement with Plan of Care agrees   Patient Care Overview   Progress no change   Outcome Evaluation   Outcome Summary/Follow up Plan Pt calm and cooperative this shift. Rates anxiety and depression both a 5. Denies SI/HI.          Problem:  Overarching Goals  Goal: Adheres to Safety Considerations for Self and Others  Outcome: Ongoing (interventions implemented as appropriate)  Goal: Optimized Coping Skills in Response to Life Stressors  Outcome: Ongoing (interventions implemented as appropriate)  Goal: Develops/Participates in Therapeutic [place] to Support Successful Transition  Outcome: Ongoing (interventions implemented as appropriate)

## 2017-02-22 NOTE — PLAN OF CARE
Problem:  Patient Care Overview (Adult)  Goal: Plan of Care Review  Outcome: Ongoing (interventions implemented as appropriate)  Patient eating & sleeping good, anxiety 4, depression 4, denied S/I, H/I & hallucinations. Has been out of room some for meals and watching TV.  Sates did not sleep good because of nightmares and not much relief after taking a vistaril.  Appears nervous/anxious.    02/22/17 1703   Coping/Psychosocial Response Interventions   Plan Of Care Reviewed With patient   Coping/Psychosocial   Patient Agreement with Plan of Care agrees   Patient Care Overview   Progress improving

## 2017-02-22 NOTE — PLAN OF CARE
Problem: BH Overarching Goals  Goal: Adheres to Safety Considerations for Self and Others  Outcome: Ongoing (interventions implemented as appropriate)  Goal: Optimized Coping Skills in Response to Life Stressors  Outcome: Ongoing (interventions implemented as appropriate)  Goal: Develops/Participates in Therapeutic [place] to Support Successful Transition  Outcome: Ongoing (interventions implemented as appropriate)

## 2017-02-22 NOTE — PROGRESS NOTES
Subjective   Arlette Felder is a 61 y.o. female     Hospital Day #4    Chief Complaint:  Depression    HPI:  History of Present Illness  Still very depressed today, she says she continues to notice some improvements after the medication changes.  She is sleeping much better on the Remeron at night.  She feels a little calmer today and slightly more hopeful after Latuda was replaced by Geodon.    Anxiety rating 5/10  Depression rating 5/10  Sleep: Better on Remeron      Review of Systems   Constitutional: Positive for fatigue.   HENT: Negative for hearing loss.    Respiratory: Negative for shortness of breath.    Cardiovascular: Negative for chest pain.   Gastrointestinal: Negative for nausea.   Musculoskeletal: Positive for myalgias. Negative for neck stiffness.   Neurological: Negative for tremors.   All other systems reviewed and are negative.      Objective   Physical Exam   Constitutional: She appears well-developed and well-nourished. No distress.   Neurological: She is alert. Coordination and gait normal.   Vitals reviewed.      Wt Readings from Last 3 Encounters:   02/18/17 259 lb (117 kg)   02/18/17 261 lb (118 kg)     Temp Readings from Last 3 Encounters:   02/22/17 96.9 °F (36.1 °C) (Temporal Artery )   02/18/17 97.9 °F (36.6 °C) (Oral)     BP Readings from Last 3 Encounters:   02/22/17 154/97   02/18/17 153/100     Pulse Readings from Last 3 Encounters:   02/22/17 93   02/18/17 84       Allergies   Allergen Reactions   • Sulfa Antibiotics Itching       Lab Results (last 24 hours)     ** No results found for the last 24 hours. **          Imaging Results (last 24 hours)     ** No results found for the last 24 hours. **          Current Medications:     amLODIPine 10 mg Oral Daily   famotidine 10 mg Oral BID   fluticasone 1 spray Each Nare Daily   lisinopril 20 mg Oral Daily   metoprolol tartrate 100 mg Oral Q12H   mirtazapine 15 mg Oral Nightly   naproxen 500 mg Oral BID With Meals   nitrofurantoin  "(macrocrystal-monohydrate) 100 mg Oral Q12H   venlafaxine 75 mg Oral BID   ziprasidone 20 mg Oral BID With Meals     •  acetaminophen  •  aluminum-magnesium hydroxide-simethicone  •  benztropine **OR** benztropine  •  hydrOXYzine  •  ibuprofen  •  loperamide  •  magnesium hydroxide  •  ondansetron      Mental Status Exam:   Appearance: Somewhat disheveled, wearing hospital gowns   Cooperation: Cooperative  Orientation: Ox4  Gait and station stable.   Psychomotor: No psychomotor agitation/retardation, No EPS, No motor tics  Speech: normal rate, amount.  Language: intact  Fund of Knowledge: average  Mood \"depressed\"   Affect: Tearful  Associations: intact  Thought Content: goal directed, no delusional material present  Thought process: linear, organized.  Suicidality: denies SI today  Homicidality: Denies HI  Perception: Denies AH/VH  Memory is intact for recent and remote events  Attention/Concentration: good  Impulse control: good  Insight: fair   Judgement: fair    Special Precaution Level: Continue current level of special precautions    Assessment/Plan:   Assessment/Plan   Patient Active Problem List   Diagnosis Code   • Major depressive disorder, recurrent, severe without psychotic features F33.2       · Continue hospitalization for safety and stabilization. We started Geodon 20 mg on 2/20/17 twice daily for mood stabilization. Continue Effexor 75 mg twice daily for mood and anxiety. Continue Remeron in place of trazodone for sleep    · DC tomorrow if all goes well.     We discussed risks, benefits, and side effects of the above medication and the patient was agreeable with the plan. I have reviewed the treatment plan and agree with current plan.  Treatment was discussed with the patient who is agreeable to this treatment and plan.           "

## 2017-02-22 NOTE — PLAN OF CARE
Problem:  Patient Care Overview (Adult)  Goal: Interdisciplinary Rounds/Family Conference  Outcome: Ongoing (interventions implemented as appropriate)    02/22/17 1136   Interdisciplinary Rounds/Family Conf   Summary Treatment team evaluation and staffing    Participants psychiatrist;patient;social work         [id number]     DATA:       Therapist staffed case with Dr. Vasquez and met individually with patient this date. Discussed progress in treatment and any needs/concerns. Patient continues to report improvements and plans to discharge tomorrow if all goes well.  She provided and additional number for her sister [phone].  Therapist continues to call without success. Reached someone for a moment and then the phone become disconnected.  Attempted several phone calls there after and cell phone was off.   Therapist continues to use brief CBT, emotional support, and reflective listening.         [id number]:      Therapist finally reached patient's daughter Court this date.  She appeared very supportive and concerned about her mother.  This was a butler contrast to the patient's report.  Court reports that patient has a pattern of dysfunction and manipulation.  She reports that for example the patient had a history of seeking her attention and then reporting suicidal.  She reports being in an internship for counseling and that she has noticed that patient has been more attention seeking and reporting suicidal ideation.  Therapist encouraged patient to attend counseling for what appears to be MDD and personality traits.  Court agreed and reports that she has set her up with counseling before and that the patient would not comply.  Therapist also educated patient's daughter about assisted living in the future and recommended [facility] if this is needed in the future. This would give the patient some independence, but also support.  Court stated that patient can return upon discharge and that the home is safe guarded.        ASSESSMENT:      Patient observed have restricted affect and mood.  She denies suicidal ideation and reports improvement since medications were adjusted. She rates depression/anxiety at 5/10.  She continues to appear to be impacted by complicated grief and personality traits.  She has been strongly recommended to also consider counseling referral for these issues but refuses.  Patient denied any new needs this date.  She is somewhat receptive to emotional support and encouragement.  Attending recreational therapy groups regularly.            Plan:     Aftercare scheduled with Dr. Savage.  Patient to return home at discharge. Patient's daughter will pick her up upon discharge.

## 2017-02-23 VITALS
DIASTOLIC BLOOD PRESSURE: 88 MMHG | RESPIRATION RATE: 18 BRPM | HEIGHT: 68 IN | TEMPERATURE: 97.1 F | HEART RATE: 89 BPM | OXYGEN SATURATION: 93 % | SYSTOLIC BLOOD PRESSURE: 131 MMHG | WEIGHT: 259 LBS | BODY MASS INDEX: 39.25 KG/M2

## 2017-02-23 PROCEDURE — 99238 HOSP IP/OBS DSCHRG MGMT 30/<: CPT

## 2017-02-23 RX ORDER — ZIPRASIDONE HYDROCHLORIDE 20 MG/1
20 CAPSULE ORAL 2 TIMES DAILY WITH MEALS
Qty: 60 CAPSULE | Refills: 0 | Status: SHIPPED | OUTPATIENT
Start: 2017-02-23 | End: 2020-05-18 | Stop reason: DRUGHIGH

## 2017-02-23 RX ORDER — HYDROXYZINE 50 MG/1
50 TABLET, FILM COATED ORAL EVERY 6 HOURS PRN
Qty: 90 TABLET | Refills: 0 | Status: SHIPPED | OUTPATIENT
Start: 2017-02-23 | End: 2020-11-10

## 2017-02-23 RX ORDER — MIRTAZAPINE 15 MG/1
15 TABLET, FILM COATED ORAL NIGHTLY
Qty: 30 TABLET | Refills: 0 | Status: SHIPPED | OUTPATIENT
Start: 2017-02-23 | End: 2020-05-18 | Stop reason: DRUGHIGH

## 2017-02-23 RX ADMIN — NITROFURANTOIN MONOHYDRATE/MACROCRYSTALLINE 100 MG: 25; 75 CAPSULE ORAL at 08:04

## 2017-02-23 RX ADMIN — NAPROXEN 500 MG: 250 TABLET ORAL at 08:03

## 2017-02-23 RX ADMIN — FAMOTIDINE 10 MG: 20 TABLET, FILM COATED ORAL at 08:04

## 2017-02-23 RX ADMIN — VENLAFAXINE HYDROCHLORIDE 75 MG: 37.5 TABLET ORAL at 08:04

## 2017-02-23 RX ADMIN — HYDROXYZINE HYDROCHLORIDE 50 MG: 50 TABLET ORAL at 13:45

## 2017-02-23 RX ADMIN — IBUPROFEN 400 MG: 400 TABLET ORAL at 04:24

## 2017-02-23 RX ADMIN — METOPROLOL TARTRATE 100 MG: 100 TABLET, FILM COATED ORAL at 08:04

## 2017-02-23 RX ADMIN — ZIPRASIDONE HYDROCHLORIDE 20 MG: 20 CAPSULE ORAL at 08:05

## 2017-02-23 RX ADMIN — AMLODIPINE BESYLATE 10 MG: 10 TABLET ORAL at 08:03

## 2017-02-23 RX ADMIN — FLUTICASONE PROPIONATE 1 SPRAY: 50 SPRAY, METERED NASAL at 08:05

## 2017-02-23 RX ADMIN — LISINOPRIL 20 MG: 10 TABLET ORAL at 08:04

## 2017-02-23 RX ADMIN — HYDROXYZINE HYDROCHLORIDE 50 MG: 50 TABLET ORAL at 04:24

## 2017-02-23 RX ADMIN — IBUPROFEN 400 MG: 400 TABLET ORAL at 13:45

## 2017-02-23 NOTE — DISCHARGE SUMMARY
"Date of Discharge:  2/23/2017    Discharge Diagnosis:  Patient Active Problem List   Diagnosis Code   • Major depressive disorder, recurrent, severe without psychotic features F33.2       Presenting Problem/History of Present Illness  MDD (major depressive disorder) [F32.9]     Hospital Course  Patient is a 61 y.o. female hospitalized for for complaints of increased anxiety and increased depression, and suicidal ideation. Patient states, \"I feel like I am having a nervous breakdown\". She reports suicidal ideation over the past month with thoughts of cutting her wrists or hanging herself. She reports feeling hopeless, helpless, and worthless. She reports she attempted suicide in December 2016 by cutting her right wrist and overdosing. She expresses guilt due to telling her  she wished he was dead 2 months prior to his death. She reports her  passed away 7 years ago and states she wants to \"go and be with her \". She reports poor sleep and poor appetite stating she has lost 28 lbs in 1.5 months. Upon assessment, patient is labile. She appears restless, tearful, and tremors are noted. Patient states, \"I feel like no one loves me because I am so messed up\". We continued her Effexor 75 mg twice daily as her home dose.  She had been switched from Seroquel to Latuda but she felt it was ineffective.  We switched this over to Geodon 20 mg twice daily for mood stabilization which seemed to be very helpful.  We switched her sleep medicine to Remeron which was very helpful for sleep. it became clear that she has some borderline personality traits. Over the course of her hospital stay she reported improvement in her mood, anxiety, and sleep.  By the day of discharge she was denying any suicidal ideation, felt more future oriented and appeared safe for discharge with outpatient follow-up.    Procedures Performed       None    Consults:   Consults     No orders found from 1/20/2017 to 2/19/2017.    " "      Pertinent Test Results:   Lab Results (last 7 days)     ** No results found for the last 168 hours. **              Mental Status Exam at Discharge:  Appearance:Neatly, casually dressed, good hygeine.   Cooperation:Cooperative  Orientation: Ox4  Gait and station stable.   Psychomotor: No psychomotor agitation/retardation, No EPS, No motor tics  Speech: normal rate, amount.  Language: intact  Fund of Knowledge: average  Mood \"better\"   Affect-congruent/appropriate.  Associations: intact  Thought Content-goal directed, no delusional material present  Thought process-linear, organized.  Suicidality: No SI  Homicidality: No HI  Perception: No AH/VH  Memory is intact for recent and remote events  Attention/Concentration: good  Impulse control-good  Insight-fair   Judgement-fair    Condition on Discharge:  stable    Vital Signs  Temp:  [98 °F (36.7 °C)-98.1 °F (36.7 °C)] 98.1 °F (36.7 °C)  Heart Rate:  [82-86] 82  Resp:  [18] 18  BP: (136-143)/(86-98) 143/98    Discharge Disposition  Home or Self Care    Discharge Medications   Arlette Felder   Home Medication Instructions BRITTANY:[id number]    Printed on:02/23/17 4855   Medication Information                      amLODIPine (NORVASC) 10 MG tablet  Take 10 mg by mouth Daily.             diclofenac (VOLTAREN) 75 MG EC tablet  Take 75 mg by mouth 2 (Two) Times a Day.             famotidine (PEPCID) 10 MG tablet  Take 10 mg by mouth 2 (Two) Times a Day.             fluticasone (FLONASE) 50 MCG/ACT nasal spray  1 spray into each nostril Daily.             hydrOXYzine (ATARAX) 50 MG tablet  Take 1 tablet by mouth Every 6 (Six) Hours As Needed for anxiety.             lisinopril (PRINIVIL,ZESTRIL) 20 MG tablet  Take 20 mg by mouth Daily.             metoprolol tartrate (LOPRESSOR) 100 MG tablet  Take 100 mg by mouth 2 (Two) Times a Day.             mirtazapine (REMERON) 15 MG tablet  Take 1 tablet by mouth Every Night.             venlafaxine (EFFEXOR) 75 MG tablet  Take 75 " mg by mouth 2 (Two) Times a Day.             ziprasidone (GEODON) 20 MG capsule  Take 1 capsule by mouth 2 (Two) Times a Day With Meals.                 Discharge Diet:   Diet Instructions     Advance Diet As Tolerated                     Activity at Discharge:   Activity Instructions     Activity as Tolerated                     Follow-up Appointments  Dr. Vega   [address] [place] [id number]  [phone]  March 4th, 2017 @ 1:30       Test Results Pending at Discharge    None     Burak Vasquez MD  02/23/17  11:46 AM

## 2017-02-23 NOTE — SIGNIFICANT NOTE
02/23/17 1143   [place] Suicide Severity Rating Scale (Discharge)   1. Wish to be Dead Yes   2. Suicidal Thoughts Yes   3. Suicidal Thoughts with Method Without Specific Plan or Intent to Act No   4. Suicidal Intent Without Specific Plan No   5. Suicide Intent with Specific Plan Yes   6. Suicide Behavior Question No   By the day of discharge, the patient was denying any suicidal ideations.

## 2017-02-23 NOTE — PROGRESS NOTES
"[id number]    DATA:      Therapist met individually with patient this date. Discussed progress in treatment and any needs/concerns. Provided her with a written safety plan format and asked her to complete.  Asked her to consider ways that she can prevent self harming in the future.  Patient frowned and stated, \"I don't know who I would call on because no one cares about me.\"  Therapist again gently confronted this cognitive distortion. Reviewed talking to her daughter at length and the apparent support there.  Patient completed safety plan.  She listed her warning signs as \"My mood will change and I will want to isolate.\"  She listed her coping skills as \"sketching, art, and reading.\"  Patient stated that her protective factors are her spirituality and family.   Patient listed 2 supports to reach out to if she has suicidal ideation again. eDbra Mejia and her daughter Court Wild.  She has also been educated on the national suicide hotline and to seek the nearest ER if suicidal intent returns.      ASSESSMENT:     Patient observed to have appropriate affect and mood.  She reports a reduction in distress and rates depression/anxiety at 3/10.  She denies SI/HI.  She appears calm and oriented x 4.  She appears motivated to discharge today.  Treatment team continues to discuss case and are concerned that there may also be some cluster b personality traits impacting patient.  Currently her diagnosis is MDD and she has been strongly encouraged to seek counseling in addition to medication management.  Her discharge diagnosis will be guarded due to her refusal to enter individual counseling.     Plan:    Aftercare scheduled with Dr. Savage.  Patient will be picked up by her daughter Court.  She is agreeable to seek the nearest ER if her symptoms become acute in the future.  Home is reported as safe guarded by daughter.    "

## 2017-02-23 NOTE — PLAN OF CARE
Problem:  Patient Care Overview (Adult)  Goal: Plan of Care Review  Outcome: Ongoing (interventions implemented as appropriate)    02/23/17 0629   Coping/Psychosocial Response Interventions   Plan Of Care Reviewed With patient   Coping/Psychosocial   Patient Agreement with Plan of Care agrees   Patient Care Overview   Progress improving   Outcome Evaluation   Outcome Summary/Follow up Plan Pt. is stable and slept all night. she reports anxiety/depression 3/3 and denies SI and HI. Pt. states she is going home today and was interacting appropriately with staff and patients in the day room.

## 2020-05-18 ENCOUNTER — OFFICE VISIT (OUTPATIENT)
Dept: CARDIOLOGY | Facility: CLINIC | Age: 65
End: 2020-05-18

## 2020-05-18 VITALS
OXYGEN SATURATION: 94 % | SYSTOLIC BLOOD PRESSURE: 158 MMHG | HEIGHT: 68 IN | BODY MASS INDEX: 44.41 KG/M2 | WEIGHT: 293 LBS | DIASTOLIC BLOOD PRESSURE: 92 MMHG | HEART RATE: 80 BPM

## 2020-05-18 DIAGNOSIS — R07.9 CHEST PAIN, UNSPECIFIED TYPE: ICD-10-CM

## 2020-05-18 DIAGNOSIS — R60.0 BILATERAL LOWER EXTREMITY EDEMA: ICD-10-CM

## 2020-05-18 DIAGNOSIS — R06.02 SHORTNESS OF BREATH: Primary | ICD-10-CM

## 2020-05-18 DIAGNOSIS — R93.89 ABNORMAL CHEST CT: ICD-10-CM

## 2020-05-18 DIAGNOSIS — I10 ESSENTIAL HYPERTENSION: ICD-10-CM

## 2020-05-18 PROCEDURE — 99204 OFFICE O/P NEW MOD 45 MIN: CPT | Performed by: NURSE PRACTITIONER

## 2020-05-18 PROCEDURE — 93000 ELECTROCARDIOGRAM COMPLETE: CPT | Performed by: NURSE PRACTITIONER

## 2020-05-18 RX ORDER — MIRTAZAPINE 30 MG/1
30 TABLET, FILM COATED ORAL NIGHTLY
COMMUNITY

## 2020-05-18 RX ORDER — ROSUVASTATIN CALCIUM 20 MG/1
20 TABLET, COATED ORAL DAILY
COMMUNITY

## 2020-05-18 RX ORDER — ZIPRASIDONE HYDROCHLORIDE 60 MG/1
60 CAPSULE ORAL DAILY
COMMUNITY

## 2020-05-18 RX ORDER — AMLODIPINE BESYLATE 5 MG/1
5 TABLET ORAL DAILY
Qty: 30 TABLET | Refills: 11 | Status: SHIPPED | OUTPATIENT
Start: 2020-05-18 | End: 2020-07-30 | Stop reason: SDUPTHER

## 2020-05-18 RX ORDER — GABAPENTIN 100 MG/1
100 CAPSULE ORAL 2 TIMES DAILY
COMMUNITY

## 2020-05-18 RX ORDER — NITROGLYCERIN 0.4 MG/1
TABLET SUBLINGUAL
Qty: 30 TABLET | Refills: 5 | Status: SHIPPED | OUTPATIENT
Start: 2020-05-18

## 2020-05-18 RX ORDER — TIZANIDINE HYDROCHLORIDE 4 MG/1
4 CAPSULE, GELATIN COATED ORAL 2 TIMES DAILY
COMMUNITY

## 2020-05-18 RX ORDER — SPIRONOLACTONE 25 MG/1
25 TABLET ORAL DAILY
Qty: 30 TABLET | Refills: 11 | Status: SHIPPED | OUTPATIENT
Start: 2020-05-18 | End: 2020-11-10 | Stop reason: SDUPTHER

## 2020-05-18 NOTE — PROGRESS NOTES
Subjective     Arlette Felder is a 64 y.o. female who presents to day for Shortness of Breath (Referred by Dr Mcdermott, was told possible CHF) and Low diffusion capacity.    CHIEF COMPLIANT  Chief Complaint   Patient presents with   • Shortness of Breath     Referred by Dr Mcdermott, was told possible CHF   • Low diffusion capacity       Active Problems:  Problem List Items Addressed This Visit     None      Visit Diagnoses     Shortness of breath    -  Primary    Relevant Orders    ECG 12 Lead    Adult Transthoracic Echo Complete W/ Cont if Necessary Per Protocol    Stress Test With Myocardial Perfusion One Day    proBNP    Basic Metabolic Panel    Chest pain, unspecified type        Relevant Medications    nitroglycerin (NITROSTAT) 0.4 MG SL tablet    Other Relevant Orders    ECG 12 Lead    Adult Transthoracic Echo Complete W/ Cont if Necessary Per Protocol    Stress Test With Myocardial Perfusion One Day    Basic Metabolic Panel    Bilateral lower extremity edema        Relevant Medications    spironolactone (ALDACTONE) 25 MG tablet    Other Relevant Orders    Adult Transthoracic Echo Complete W/ Cont if Necessary Per Protocol    Stress Test With Myocardial Perfusion One Day    proBNP    Basic Metabolic Panel    Abnormal chest CT        Relevant Orders    Adult Transthoracic Echo Complete W/ Cont if Necessary Per Protocol    Stress Test With Myocardial Perfusion One Day    Basic Metabolic Panel    Essential hypertension        Relevant Medications    spironolactone (ALDACTONE) 25 MG tablet    amLODIPine (NORVASC) 5 MG tablet    Other Relevant Orders    Basic Metabolic Panel          HPI  HPI  Ms. Felder is a 64-year-old female who is establishing care today for shortness of breath and low diffusion capacity that was referred by Dr. Sharron Castillo.  Patient says ever since last summer she has been having chest pain in her left chest that is occurring more often and is intermittent in nature and usually last less than  5 minutes per episode.  She describes the sensation as felt heart just quit pinching and uncomfortable tightness.  Associated symptoms include shortness of air, dizziness, nausea, no obvious aggravating factors, relieving factors, or reported treatments.  She states the pain can become quite severe at times.  Also reportsshe has been short of breath for some while that is worse with exertion in which she describes a sensation of just being winded and she reports that little activity such as walking to the mailbox induces the shortness of air.  At times she does have the chest tightness with the shortness of air rest does seem to help relieve the shortness of air.  Last summer she also developed leg swelling and she has been placed on diuretics per Dr. Munoz but it keeps coming back she says that it gets as bad as where she cannot even wear shoes.  She has gained from 150 pounds to 305 in a year per her report.  Patient also reports dizziness that occurs at rest and rarely if she is standing or walking she has to stop and gather herself at times.  Also reports palpitations where she feels her heart skipping at times and is very similar and often associated with the chest pain.  PRIOR MEDS  Current Outpatient Medications on File Prior to Visit   Medication Sig Dispense Refill   • diclofenac (VOLTAREN) 75 MG EC tablet Take 75 mg by mouth 2 (Two) Times a Day.     • fluticasone (FLONASE) 50 MCG/ACT nasal spray 1 spray into each nostril Daily.     • gabapentin (NEURONTIN) 100 MG capsule Take 100 mg by mouth 2 (Two) Times a Day.     • hydrOXYzine (ATARAX) 50 MG tablet Take 1 tablet by mouth Every 6 (Six) Hours As Needed for anxiety. (Patient taking differently: Take 50 mg by mouth 2 (Two) Times a Day.) 90 tablet 0   • lisinopril (PRINIVIL,ZESTRIL) 20 MG tablet Take 20 mg by mouth Daily.     • metoprolol tartrate (LOPRESSOR) 100 MG tablet Take 100 mg by mouth 2 (Two) Times a Day.     • mirtazapine (REMERON) 30 MG tablet Take  30 mg by mouth Every Night.     • O2 (OXYGEN) Inhale 2 L/min Every Night.     • rosuvastatin (CRESTOR) 20 MG tablet Take 20 mg by mouth Daily.     • TiZANidine (Zanaflex) 4 MG capsule Take 4 mg by mouth 2 (Two) Times a Day.     • venlafaxine (EFFEXOR) 75 MG tablet Take 75 mg by mouth Daily.     • ziprasidone (Geodon) 60 MG capsule Take 60 mg by mouth Daily.       No current facility-administered medications on file prior to visit.        ALLERGIES  Sulfa antibiotics    HISTORY  Past Medical History:   Diagnosis Date   • Acid reflux    • Anxiety    • Asthma    • Bipolar disorder (CMS/[facility])    • Chronic pain disorder     back pain   • Depression    • Environmental allergies    • Hyperlipidemia    • Hypertension    • Insomnia    • Rheumatic fever    • Suicidal thoughts    • Suicide attempt (CMS/[facility])        Social History     Socioeconomic History   • Marital status:      Spouse name: Not on file   • Number of children: Not on file   • Years of education: Not on file   • Highest education level: Not on file   Tobacco Use   • Smoking status: Never Smoker   • Smokeless tobacco: Never Used   Substance and Sexual Activity   • Alcohol use: No   • Drug use: No     Comment: reports taking only prescribed medications   • Sexual activity: Never       Family History   Problem Relation Age of Onset   • Bipolar disorder Paternal Grandfather    • Anxiety disorder Mother    • Depression Mother    • Heart disease Mother    • Hypertension Mother    • Alcohol abuse Father    • Heart disease Father    • Hypertension Father    • No Known Problems Sister        Review of Systems   Constitutional: Positive for fatigue. Negative for chills and fever.   HENT: Positive for sinus pressure. Negative for congestion and sore throat.    Eyes: Positive for visual disturbance (glasses).   Respiratory: Positive for chest tightness and shortness of breath (O2@ hs).    Cardiovascular: Positive for chest pain (happening more often, first started  "last summer), palpitations (skips at times) and leg swelling (more then 2 years, has been seeing Dr Munoz for edema).   Gastrointestinal: Negative.  Negative for abdominal pain, blood in stool, constipation, diarrhea, nausea and vomiting.   Endocrine: Negative.  Negative for cold intolerance and heat intolerance.   Genitourinary: Positive for frequency. Negative for dysuria, hematuria and urgency.   Musculoskeletal: Positive for back pain (surgery few months ago). Negative for arthralgias and neck pain.   Skin: Negative.  Negative for rash and wound.   Allergic/Immunologic: Positive for environmental allergies (seasonal ). Negative for food allergies.   Neurological: Positive for dizziness (happens frequently) and light-headedness (frequently). Negative for syncope.   Hematological: Bruises/bleeds easily (bruises).   Psychiatric/Behavioral: Positive for sleep disturbance (uses oxygen at hs, reports wakes at times with soa and cp). Negative for agitation.       Objective     VITALS: /92 (BP Location: Left arm, Patient Position: Sitting)   Pulse 80   Ht 172.7 cm (68\")   Wt (!) 138 kg (305 lb)   SpO2 94%   BMI 46.38 kg/m²     LABS:   Lab Results (most recent)     None          IMAGING:   No Images in the past 120 days found..    EXAM:  Physical Exam   Constitutional: She is oriented to person, place, and time. She appears well-developed and well-nourished.   HENT:   Head: Normocephalic and atraumatic.   Eyes: Pupils are equal, round, and reactive to light. EOM are normal.   Neck: Trachea normal and phonation normal. Neck supple. No JVD present. Carotid bruit is not present. No thyroid mass present.   Cardiovascular: Normal rate, regular rhythm and intact distal pulses. Exam reveals no gallop and no friction rub.   Murmur heard.  Pulses:       Radial pulses are 2+ on the right side, and 2+ on the left side.        Posterior tibial pulses are 2+ on the right side, and 2+ on the left side.   Pulmonary/Chest: " Effort normal and breath sounds normal. No respiratory distress. She has no wheezes. She has no rales.   Abdominal: Soft. Bowel sounds are normal. She exhibits no distension and no abdominal bruit. There is no tenderness.   Musculoskeletal: Normal range of motion. She exhibits edema (2+ BLE).   Neurological: She is alert and oriented to person, place, and time. She has normal strength. No cranial nerve deficit or sensory deficit.   Skin: Skin is warm, dry and intact. Capillary refill takes less than 2 seconds. No rash noted.   Psychiatric: She has a normal mood and affect. Her speech is normal and behavior is normal. Judgment and thought content normal. Cognition and memory are normal.   Nursing note and vitals reviewed.      Procedure     ECG 12 Lead  Date/Time: 5/18/2020 2:36 PM  Performed by: Edmar Mei APRN  Authorized by: Edmar Mei APRN   Comparison: not compared with previous ECG   Rhythm: sinus rhythm  Rate: normal  BPM: 70  QRS axis: normal    Clinical impression: normal ECG  Comments: No acute changes               Assessment/Plan    Diagnosis Plan   1. Shortness of breath  ECG 12 Lead    Adult Transthoracic Echo Complete W/ Cont if Necessary Per Protocol    Stress Test With Myocardial Perfusion One Day    proBNP    Basic Metabolic Panel   2. Chest pain, unspecified type  ECG 12 Lead    Adult Transthoracic Echo Complete W/ Cont if Necessary Per Protocol    Stress Test With Myocardial Perfusion One Day    Basic Metabolic Panel    nitroglycerin (NITROSTAT) 0.4 MG SL tablet   3. Bilateral lower extremity edema  Adult Transthoracic Echo Complete W/ Cont if Necessary Per Protocol    Stress Test With Myocardial Perfusion One Day    spironolactone (ALDACTONE) 25 MG tablet    proBNP    Basic Metabolic Panel   4. Abnormal chest CT  Adult Transthoracic Echo Complete W/ Cont if Necessary Per Protocol    Stress Test With Myocardial Perfusion One Day    Basic Metabolic Panel   5. Essential hypertension   spironolactone (ALDACTONE) 25 MG tablet    Basic Metabolic Panel    amLODIPine (NORVASC) 5 MG tablet   1.  Due to patient's shortness of air, chest pain, abnormal CT of the chest, prolonged hypertension of unknown time, and significant bilateral lower extremity edema I do feel it is appropriate for a an ischemic work-up as well as a heart failure work-up including stress test and echocardiogram.  Patient does have comorbidities of hypertension and rheumatic fever.  2.  Patient will be prescribed sublingual nitroglycerin for chest pain in which she can take up to 3 doses 5 minutes apart or until chest pain is relieved.  Patient advised to go to the emergency department if pain is not relieved by the third dose.  3.  Patient does have significant bilateral lower extremity edema and 2+ I would like to place the patient on Spironolactone 25 mg daily and repeat a BMP and proBNP in approximately 2 weeks.  4.  Patient's blood pressure is elevated today at 158/92 despite the metoprolol 100 mg, lisinopril 20 mg and amlodipine 10 mg adding the Aldactone will assist in blood pressure control as well.  Unfortunately I will have to decrease the amlodipine to 5 mg to see if it is contributing to the lower extremity edema.  However if there is no resolution of edema with decrease in the amlodipine I will add back to 10 mg daily.  Or split the dose to 5 mg twice daily.  Patient advised to monitor his blood pressure on a daily basis and report any significant highs or lows.  5.  Informed of signs and symptoms of ACS and advised to seek emergent treatment for any new worsening symptoms.  Patient also advised sooner follow-up as needed.  Also advised to follow-up with family doctor as needed    Return in about 3 months (around 8/18/2020).    Arlette was seen today for shortness of breath and low diffusion capacity.    Diagnoses and all orders for this visit:    Shortness of breath  -     ECG 12 Lead  -     Adult Transthoracic Echo  Complete W/ Cont if Necessary Per Protocol; Future  -     Stress Test With Myocardial Perfusion One Day; Future  -     proBNP; Future  -     Basic Metabolic Panel; Future    Chest pain, unspecified type  -     ECG 12 Lead  -     Adult Transthoracic Echo Complete W/ Cont if Necessary Per Protocol; Future  -     Stress Test With Myocardial Perfusion One Day; Future  -     Basic Metabolic Panel; Future  -     nitroglycerin (NITROSTAT) 0.4 MG SL tablet; 1 under the tongue as needed for angina, may repeat q5mins for up three doses    Bilateral lower extremity edema  -     Adult Transthoracic Echo Complete W/ Cont if Necessary Per Protocol; Future  -     Stress Test With Myocardial Perfusion One Day; Future  -     spironolactone (ALDACTONE) 25 MG tablet; Take 1 tablet by mouth Daily.  -     proBNP; Future  -     Basic Metabolic Panel; Future    Abnormal chest CT  -     Adult Transthoracic Echo Complete W/ Cont if Necessary Per Protocol; Future  -     Stress Test With Myocardial Perfusion One Day; Future  -     Basic Metabolic Panel; Future    Essential hypertension  -     spironolactone (ALDACTONE) 25 MG tablet; Take 1 tablet by mouth Daily.  -     Basic Metabolic Panel; Future  -     amLODIPine (NORVASC) 5 MG tablet; Take 1 tablet by mouth Daily.        Arlette Felder  reports that she has never smoked. She has never used smokeless tobacco..      Patient's Body mass index is 46.38 kg/m². BMI is above normal parameters. Recommendations include: educational material.           MEDS ORDERED DURING VISIT:  New Medications Ordered This Visit   Medications   • spironolactone (ALDACTONE) 25 MG tablet     Sig: Take 1 tablet by mouth Daily.     Dispense:  30 tablet     Refill:  11   • amLODIPine (NORVASC) 5 MG tablet     Sig: Take 1 tablet by mouth Daily.     Dispense:  30 tablet     Refill:  11   • nitroglycerin (NITROSTAT) 0.4 MG SL tablet     Si under the tongue as needed for angina, may repeat q5mins for up three doses      Dispense:  30 tablet     Refill:  5           This document has been electronically signed by Edmar Mei Jr., APRN  May 19, 2020 08:28

## 2020-05-18 NOTE — PATIENT INSTRUCTIONS
"Fat and Cholesterol Restricted Eating Plan  Getting too much fat and cholesterol in your diet may cause health problems. Choosing the right foods helps keep your fat and cholesterol at normal levels. This can keep you from getting certain diseases.  Your doctor may recommend an eating plan that includes:  · Total fat: ______% or less of total calories a day.  · Saturated fat: ______% or less of total calories a day.  · Cholesterol: less than _________mg a day.  · Fiber: ______g a day.  What are tips for following this plan?  Meal planning  · At meals, divide your plate into four equal parts:  ? Fill one-half of your plate with vegetables and green salads.  ? Fill one-fourth of your plate with whole grains.  ? Fill one-fourth of your plate with low-fat (lean) protein foods.  · Eat fish that is high in omega-3 fats at least two times a week. This includes mackerel, tuna, sardines, and salmon.  · Eat foods that are high in fiber, such as whole grains, beans, apples, broccoli, carrots, peas, and barley.  General tips    · Work with your doctor to lose weight if you need to.  · Avoid:  ? Foods with added sugar.  ? Fried foods.  ? Foods with partially hydrogenated oils.  · Limit alcohol intake to no more than 1 drink a day for nonpregnant women and 2 drinks a day for men. One drink equals 12 oz of beer, 5 oz of wine, or 1½ oz of hard liquor.  Reading food labels  · Check food labels for:  ? Trans fats.  ? Partially hydrogenated oils.  ? Saturated fat (g) in each serving.  ? Cholesterol (mg) in each serving.  ? Fiber (g) in each serving.  · Choose foods with healthy fats, such as:  ? Monounsaturated fats.  ? Polyunsaturated fats.  ? Omega-3 fats.  · Choose grain products that have whole grains. Look for the word \"whole\" as the first word in the ingredient list.  Cooking  · Cook foods using low-fat methods. These include baking, boiling, grilling, and broiling.  · Eat more home-cooked foods. Eat at restaurants and buffets " less often.  · Avoid cooking using saturated fats, such as butter, cream, palm oil, palm kernel oil, and coconut oil.  Recommended foods    Fruits  · All fresh, canned (in natural juice), or frozen fruits.  Vegetables  · Fresh or frozen vegetables (raw, steamed, roasted, or grilled). Green salads.  Grains  · Whole grains, such as whole wheat or whole grain breads, crackers, cereals, and pasta. Unsweetened oatmeal, bulgur, barley, quinoa, or brown rice. Corn or whole wheat flour tortillas.  Meats and other protein foods  · Ground beef (85% or leaner), grass-fed beef, or beef trimmed of fat. Skinless chicken or turkey. Ground chicken or turkey. Pork trimmed of fat. All fish and seafood. Egg whites. Dried beans, peas, or lentils. Unsalted nuts or seeds. Unsalted canned beans. Nut butters without added sugar or oil.  Dairy  · Low-fat or nonfat dairy products, such as skim or 1% milk, 2% or reduced-fat cheeses, low-fat and fat-free ricotta or cottage cheese, or plain low-fat and nonfat yogurt.  Fats and oils  · Tub margarine without trans fats. Light or reduced-fat mayonnaise and salad dressings. Avocado. Olive, canola, sesame, or safflower oils.  The items listed above may not be a complete list of foods and beverages you can eat. Contact a dietitian for more information.  Foods to avoid  Fruits  · Canned fruit in heavy syrup. Fruit in cream or butter sauce. Fried fruit.  Vegetables  · Vegetables cooked in cheese, cream, or butter sauce. Fried vegetables.  Grains  · White bread. White pasta. White rice. Cornbread. Bagels, pastries, and croissants. Crackers and snack foods that contain trans fat and hydrogenated oils.  Meats and other protein foods  · Fatty cuts of meat. Ribs, chicken wings, bryan, sausage, bologna, salami, chitterlings, fatback, hot dogs, bratwurst, and packaged lunch meats. Liver and organ meats. Whole eggs and egg yolks. Chicken and turkey with skin. Fried meat.  Dairy  · Whole or 2% milk, cream,  half-and-half, and cream cheese. Whole milk cheeses. Whole-fat or sweetened yogurt. Full-fat cheeses. Nondairy creamers and whipped toppings. Processed cheese, cheese spreads, and cheese curds.  Beverages  · Alcohol. Sugar-sweetened drinks such as sodas, lemonade, and fruit drinks.  Fats and oils  · Butter, stick margarine, lard, shortening, ghee, or bryan fat. Coconut, palm kernel, and palm oils.  Sweets and desserts  · Corn syrup, sugars, honey, and molasses. Candy. Jam and jelly. Syrup. Sweetened cereals. Cookies, pies, cakes, donuts, muffins, and ice cream.  The items listed above may not be a complete list of foods and beverages you should avoid. Contact a dietitian for more information.  Summary  · Choosing the right foods helps keep your fat and cholesterol at normal levels. This can keep you from getting certain diseases.  · At meals, fill one-half of your plate with vegetables and green salads.  · Eat high-fiber foods, like whole grains, beans, apples, carrots, peas, and barley.  · Limit added sugar, saturated fats, alcohol, and fried foods.  This information is not intended to replace advice given to you by your health care provider. Make sure you discuss any questions you have with your health care provider.  Document Released: 06/18/2013 Document Revised: 08/21/2019 Document Reviewed: 09/04/2018  [employer] Interactive Patient Education © 2020 [employer]  BMI for Adults    Body mass index (BMI) is a number that is calculated from a person's weight and height. BMI may help to estimate how much of a person's weight is composed of fat. BMI can help identify those who may be at higher risk for certain medical problems.  How is BMI used with adults?  BMI is used as a screening tool to identify possible weight problems. It is used to check whether a person is obese, overweight, healthy weight, or underweight.  How is BMI calculated?  BMI measures your weight and compares it to your height. This can be done  "either in English ([place] or metric measurements. Note that charts are available to help you find your BMI quickly and easily without having to do these calculations yourself.  To calculate your BMI in English ([place] measurements, your health care provider will:  1. Measure your weight in pounds (lb).  2. Multiply the number of pounds by 703.  ? For example, for a person who weighs 180 lb, multiply that number by 703, which equals 126,540.  3. Measure your height in inches (in). Then multiply that number by itself to get a measurement called \"inches squared.\"  ? For example, for a person who is 70 in tall, the \"inches squared\" measurement is 70 in x 70 in, which equals 4900 inches squared.  4. Divide the total from Step 2 (number of lb x 703) by the total from Step 3 (inches squared): 126,540 ÷ 4900 = 25.8. This is your BMI.  To calculate your BMI in metric measurements, your health care provider will:  1. Measure your weight in kilograms (kg).  2. Measure your height in meters (m). Then multiply that number by itself to get a measurement called \"meters squared.\"  ? For example, for a person who is 1.75 m tall, the \"meters squared\" measurement is 1.75 m x 1.75 m, which is equal to 3.1 meters squared.  3. Divide the number of kilograms (your weight) by the meters squared number. In this example: 70 ÷ 3.1 = 22.6. This is your BMI.  How is BMI interpreted?  To interpret your results, your health care provider will use BMI charts to identify whether you are underweight, normal weight, overweight, or obese. The following guidelines will be used:  · Underweight: BMI less than 18.5.  · Normal weight: BMI between 18.5 and 24.9.  · Overweight: BMI between 25 and 29.9.  · Obese: BMI of 30 and above.  Please note:  · Weight includes both fat and muscle, so someone with a muscular build, such as an athlete, may have a BMI that is higher than 24.9. In cases like these, BMI is not an accurate measure of body fat.  · To determine " if excess body fat is the cause of a BMI of 25 or higher, further assessments may need to be done by a health care provider.  · BMI is usually interpreted in the same way for men and women.  Why is BMI a useful tool?  BMI is useful in two ways:  · Identifying a weight problem that may be related to a medical condition, or that may increase the risk for medical problems.  · Promoting lifestyle and diet changes in order to reach a healthy weight.  Summary  · Body mass index (BMI) is a number that is calculated from a person's weight and height.  · BMI may help to estimate how much of a person's weight is composed of fat. BMI can help identify those who may be at higher risk for certain medical problems.  · BMI can be measured using English measurements or metric measurements.  · To interpret your results, your health care provider will use BMI charts to identify whether you are underweight, normal weight, overweight, or obese.  This information is not intended to replace advice given to you by your health care provider. Make sure you discuss any questions you have with your health care provider.  Document Released: 08/29/2005 Document Revised: 10/31/2018 Document Reviewed: 10/31/2018  [employer] Interactive Patient Education © 2020 [employer]    Acute Coronary Syndrome    Acute coronary syndrome (ACS) is a serious problem in which there is suddenly not enough blood and oxygen reaching the heart. ACS can result in chest pain or a heart attack.  This condition is a medical emergency. If you have any symptoms of this condition, get help right away.  What are the causes?  This condition may be caused by:  · Buildup of fat and cholesterol inside of the arteries (atherosclerosis). This is the most common cause. The buildup (plaque) can cause blood vessels in the heart (coronary arteries) to become narrow or blocked, which reduces blood flow to the heart. Plaque can also break off and lead to a clot, which can block an  artery and cause a heart attack or stroke.  · Sudden tightening of the muscles around the coronary arteries (coronary spasm).  · Tearing of a coronary artery (spontaneous coronary artery dissection).  · Very low blood pressure (hypotension).  · An abnormal heartbeat (arrhythmia).  · Other medical conditions that cause a decrease of oxygen to the heart, such as anemiaorrespiratory failure.  · Using cocaine or methamphetamine.  What increases the risk?  The following factors may make you more likely to develop this condition:  · Age. The risk for ACS increases as you get older.  · History of chest pain, heart attack, peripheral artery disease, or stroke.  · Having taken chemotherapy or immune-suppressing medicines.  · Being male.  · Family history of chest pain, heart disease, or stroke.  · Smoking.  · Not exercising enough.  · Being overweight.  · High cholesterol.  · High blood pressure (hypertension).  · Diabetes.  · Excessive alcohol use.  What are the signs or symptoms?  Common symptoms of this condition include:  · Chest pain. The pain may last a long time, or it may stop and come back (recur). It may feel like:  ? Crushing or squeezing.  ? Tightness, pressure, fullness, or heaviness.  · Arm, neck, jaw, or back pain.  · Heartburn or indigestion.  · Shortness of breath.  · Nausea.  · Sudden cold sweats.  · Light-headedness.  · Dizziness, or passing out.  · Tiredness (fatigue).  Sometimes there are no symptoms.  How is this diagnosed?  This condition may be diagnosed based on:  · Your medical history and symptoms.  · An electrocardiogram (ECG). This imaging test measures the heart's electrical activity.  · Blood tests. Cardiac blood tests may need to be repeated at designated time intervals.  · Chest X-ray.  · A CT scan of the chest.  · A coronary angiogram. This is a procedure in which dye is injected into the bloodstream and then X-rays are taken to show if there is a blockage in a coronary artery.  · Exercise  stress testing.  · Echocardiography. This is a test that uses sound waves to produce detailed images of the heart.  How is this treated?  The treatment is to restore blood flow to the heart as soon as possible. Treatment for this condition may include:  · Oxygen therapy.  · Medicines, such as:  ? Antiplatelet medicines and blood-thinning medicines, such as aspirin. These help prevent blood clots.  ? Medicine that dissolves any blood clots (fibrinolytic therapy).  ? Blood pressure medicines.  ? Nitroglycerin. This helps relieve chest pain and widens blood vessels to improve blood flow.  ? Pain medicine.  ? Cholesterol-lowering medicine.  · Surgery, such as:  ? Coronary angioplasty with stent placement. This involves placing a small piece of metal that looks like mesh or a spring into a narrow coronary artery. This widens the artery and keep it open.  ? Coronary artery bypass surgery. This involves taking a section of a blood vessel from a different part of your body, and placing it on the blocked coronary artery to allow blood to flow around (bypass) the blockage.  · Cardiac rehabilitation. This is a program that helps improve your health and well-being. It includes exercise training, education, and counseling to help you recover.  Follow these instructions at home:  Eating and drinking  · Eat a heart-healthy diet that includes whole grains, fruits and vegetables, lean proteins, and low-fat or nonfat dairy products.  · Limit how much salt (sodium) you eat as told by your health care provider. Follow instructions from your health care provider about any other eating or drinking restrictions, such as limiting foods that are high in fat and processed sugars.  · Use healthy cooking methods such as roasting, grilling, broiling, baking, poaching, steaming, or stir-frying.  · Talk with a dietitian to learn about healthy cooking methods and how to eat less sodium.  Medicines  · Take over-the-counter and prescription  medicines only as told by your health care provider.  · Do not take these medicines unless your health care provider approves:  ? Vitamin supplements that contain vitamin A or vitamin E.  ? Nonsteroidal anti-inflammatory drugs (NSAIDs), such as ibuprofen, naproxen, or celecoxib.  ? Hormone replacement therapy that contains estrogen.  If you are taking blood thinners:  · Talk with your health care provider before you take any medicines that contain aspirin or NSAIDs. These medicines increase your risk for dangerous bleeding.  · Take your medicine exactly as told, at the same time every day.  · Avoid activities that could cause injury or bruising, and follow instructions about how to prevent falls.  · Wear a medical alert bracelet, and carry a card that lists what medicines you take.  Activity  · Join a cardiac rehabilitation program. An exercise plan will be developed for you.  · Ask your health care provider:  ? What activities and exercises are safe for you.  ? If you should follow specific instructions about lifting, driving, or climbing stairs.  Lifestyle  · Do not use any products that contain nicotine or tobacco, such as cigarettes and e-cigarettes. If you need help quitting, ask your health care provider.  · If your health care provider says that alcohol is safe for you, limit your alcohol intake to no more than 1 drink a day. One drink equals 12 oz of beer, 5 oz of wine, or 1½ oz of hard liquor.  · Maintain a healthy weight. If you need to lose weight, work with your health care provider to do so safely.  General instructions  · Tell all the health care providers who care for you about your heart condition, including your dentist. This may affect the medicines or treatment you receive.  · Manage any other health conditions you have, such as hypertension or diabetes. These conditions affect your heart.  · Learn ways to manage stress.  · Get screened for depression, and get mental health treatment if you need  it. People with ACS are at higher risk for depression.  · Keep your vaccinations up to date. Get the flu shot (influenza vaccine) every year.  · If directed, monitor your blood pressure at home.  · Keep all follow-up visits as told by your health care provider. This is important.  Contact a health care provider if:  · You feel overwhelmed or sad.  · You have trouble doing your daily activities.  Get help right away if:  · You have pain in your chest, neck, arm, jaw, stomach, or back that recurs, and:  ? It lasts for more than a few minutes.  ? It is not relieved by taking the medicineyour health care provider prescribed.  · You have unexplained:  ? Heavy sweating.  ? Heartburn or indigestion.  ? Nausea or vomiting.  ? Shortness of breath.  ? Difficulty breathing.  ? Fatigue.  ? Nervousness or anxiety.  ? Weakness.  ? Diarrhea.  ? Dark stools or blood in your stool.  · You have sudden light-headedness or dizziness.  · Your blood pressure is higher than 180/120.  · You faint.  · You have thoughts about hurting yourself.  These symptoms may represent a serious problem that is an emergency. Do not wait to see if the symptoms will go away. Get medical help right away. Call your local emergency services (672 in the [place] Do not drive yourself to the hospital.   If you ever feel like you may hurt yourself or others, or have thoughts about taking your own life, get help right away. You can go to your nearest emergency department or call:  · Emergency services (981 in the [place]  · A suicide crisis helpline, such as the National Suicide Prevention Lifeline at 1-640.824.8152. This is open 24 hours a day.  Summary  · Acute coronary syndrome (ACS) is when there is not enough blood and oxygen being supplied to the heart. ACS can result in chest pain or a heart attack.  · Acute coronary syndrome is a medical emergency. If you have any symptoms of this condition, get help right away.  · Treatment includes medicines and procedures  to open the blocked arteries and restore blood flow.  This information is not intended to replace advice given to you by your health care provider. Make sure you discuss any questions you have with your health care provider.  Document Released: 12/18/2006 Document Revised: 08/28/2018 Document Reviewed: 08/28/2018  [employer] Interactive Patient Education © 2019 [employer]    Acute Coronary Syndrome    Acute coronary syndrome (ACS) is a serious problem in which there is suddenly not enough blood and oxygen reaching the heart. ACS can result in chest pain or a heart attack.  This condition is a medical emergency. If you have any symptoms of this condition, get help right away.  What are the causes?  This condition may be caused by:  Buildup of fat and cholesterol inside of the arteries (atherosclerosis). This is the most common cause. The buildup (plaque) can cause blood vessels in the heart (coronary arteries) to become narrow or blocked, which reduces blood flow to the heart. Plaque can also break off and lead to a clot, which can block an artery and cause a heart attack or stroke.  Sudden tightening of the muscles around the coronary arteries (coronary spasm).  Tearing of a coronary artery (spontaneous coronary artery dissection).  Very low blood pressure (hypotension).  An abnormal heartbeat (arrhythmia).  Other medical conditions that cause a decrease of oxygen to the heart, such as anemiaorrespiratory failure.  Using cocaine or methamphetamine.  What increases the risk?  The following factors may make you more likely to develop this condition:  Age. The risk for ACS increases as you get older.  History of chest pain, heart attack, peripheral artery disease, or stroke.  Having taken chemotherapy or immune-suppressing medicines.  Being male.  Family history of chest pain, heart disease, or stroke.  Smoking.  Not exercising enough.  Being overweight.  High cholesterol.  High blood pressure  (hypertension).  Diabetes.  Excessive alcohol use.  What are the signs or symptoms?  Common symptoms of this condition include:  Chest pain. The pain may last a long time, or it may stop and come back (recur). It may feel like:  Crushing or squeezing.  Tightness, pressure, fullness, or heaviness.  Arm, neck, jaw, or back pain.  Heartburn or indigestion.  Shortness of breath.  Nausea.  Sudden cold sweats.  Light-headedness.  Dizziness, or passing out.  Tiredness (fatigue).  Sometimes there are no symptoms.  How is this diagnosed?  This condition may be diagnosed based on:  Your medical history and symptoms.  An electrocardiogram (ECG). This imaging test measures the heart's electrical activity.  Blood tests. Cardiac blood tests may need to be repeated at designated time intervals.  Chest X-ray.  A CT scan of the chest.  A coronary angiogram. This is a procedure in which dye is injected into the bloodstream and then X-rays are taken to show if there is a blockage in a coronary artery.  Exercise stress testing.  Echocardiography. This is a test that uses sound waves to produce detailed images of the heart.  How is this treated?  The treatment is to restore blood flow to the heart as soon as possible. Treatment for this condition may include:  Oxygen therapy.  Medicines, such as:  Antiplatelet medicines and blood-thinning medicines, such as aspirin. These help prevent blood clots.  Medicine that dissolves any blood clots (fibrinolytic therapy).  Blood pressure medicines.  Nitroglycerin. This helps relieve chest pain and widens blood vessels to improve blood flow.  Pain medicine.  Cholesterol-lowering medicine.  Surgery, such as:  Coronary angioplasty with stent placement. This involves placing a small piece of metal that looks like mesh or a spring into a narrow coronary artery. This widens the artery and keep it open.  Coronary artery bypass surgery. This involves taking a section of a blood vessel from a different  part of your body, and placing it on the blocked coronary artery to allow blood to flow around (bypass) the blockage.  Cardiac rehabilitation. This is a program that helps improve your health and well-being. It includes exercise training, education, and counseling to help you recover.  Follow these instructions at home:  Eating and drinking  Eat a heart-healthy diet that includes whole grains, fruits and vegetables, lean proteins, and low-fat or nonfat dairy products.  Limit how much salt (sodium) you eat as told by your health care provider. Follow instructions from your health care provider about any other eating or drinking restrictions, such as limiting foods that are high in fat and processed sugars.  Use healthy cooking methods such as roasting, grilling, broiling, baking, poaching, steaming, or stir-frying.  Talk with a dietitian to learn about healthy cooking methods and how to eat less sodium.  Medicines  Take over-the-counter and prescription medicines only as told by your health care provider.  Do not take these medicines unless your health care provider approves:  Vitamin supplements that contain vitamin A or vitamin E.  Nonsteroidal anti-inflammatory drugs (NSAIDs), such as ibuprofen, naproxen, or celecoxib.  Hormone replacement therapy that contains estrogen.  If you are taking blood thinners:  Talk with your health care provider before you take any medicines that contain aspirin or NSAIDs. These medicines increase your risk for dangerous bleeding.  Take your medicine exactly as told, at the same time every day.  Avoid activities that could cause injury or bruising, and follow instructions about how to prevent falls.  Wear a medical alert bracelet, and carry a card that lists what medicines you take.  Activity  Join a cardiac rehabilitation program. An exercise plan will be developed for you.  Ask your health care provider:  What activities and exercises are safe for you.  If you should follow specific  instructions about lifting, driving, or climbing stairs.  Lifestyle  Do not use any products that contain nicotine or tobacco, such as cigarettes and e-cigarettes. If you need help quitting, ask your health care provider.  If your health care provider says that alcohol is safe for you, limit your alcohol intake to no more than 1 drink a day. One drink equals 12 oz of beer, 5 oz of wine, or 1½ oz of hard liquor.  Maintain a healthy weight. If you need to lose weight, work with your health care provider to do so safely.  General instructions  Tell all the health care providers who care for you about your heart condition, including your dentist. This may affect the medicines or treatment you receive.  Manage any other health conditions you have, such as hypertension or diabetes. These conditions affect your heart.  Learn ways to manage stress.  Get screened for depression, and get mental health treatment if you need it. People with ACS are at higher risk for depression.  Keep your vaccinations up to date. Get the flu shot (influenza vaccine) every year.  If directed, monitor your blood pressure at home.  Keep all follow-up visits as told by your health care provider. This is important.  Contact a health care provider if:  You feel overwhelmed or sad.  You have trouble doing your daily activities.  Get help right away if:  You have pain in your chest, neck, arm, jaw, stomach, or back that recurs, and:  It lasts for more than a few minutes.  It is not relieved by taking the medicineyour health care provider prescribed.  You have unexplained:  Heavy sweating.  Heartburn or indigestion.  Nausea or vomiting.  Shortness of breath.  Difficulty breathing.  Fatigue.  Nervousness or anxiety.  Weakness.  Diarrhea.  Dark stools or blood in your stool.  You have sudden light-headedness or dizziness.  Your blood pressure is higher than 180/120.  You faint.  You have thoughts about hurting yourself.  These symptoms may represent a  serious problem that is an emergency. Do not wait to see if the symptoms will go away. Get medical help right away. Call your local emergency services (661 in the [place] Do not drive yourself to the hospital.   If you ever feel like you may hurt yourself or others, or have thoughts about taking your own life, get help right away. You can go to your nearest emergency department or call:  Emergency services (890 in the [place]  A suicide crisis helpline, such as the National Suicide Prevention Lifeline at 1-906.836.4420. This is open 24 hours a day.  Summary  Acute coronary syndrome (ACS) is when there is not enough blood and oxygen being supplied to the heart. ACS can result in chest pain or a heart attack.  Acute coronary syndrome is a medical emergency. If you have any symptoms of this condition, get help right away.  Treatment includes medicines and procedures to open the blocked arteries and restore blood flow.  This information is not intended to replace advice given to you by your health care provider. Make sure you discuss any questions you have with your health care provider.  Document Released: 12/18/2006 Document Revised: 08/28/2018 Document Reviewed: 08/28/2018  [employer] Interactive Patient Education © 2019 [employer]    Pulmonary Edema  Pulmonary edema is a condition in which fluid collects in the air sacs of the lung. This makes it hard for the lungs to fill with air. It also prevents the lungs from moving oxygen into the bloodstream, which can affect other organs, such as the brain and kidneys. Pulmonary edema is an emergency and should be treated immediately.  There are two main types of pulmonary edema:  · Cardiogenic. This means the pulmonary edema was caused by a problem with the heart.  · Noncardiogenic. This means the pulmonary edema was caused by something other than the heart, such as an injury to the lung.  What are the causes?  This condition is commonly caused by heart failure. When this  happens, the heart is not able to properly pump blood through the body. This can lead to increased pressure in the heart and blood building up in the veins around the lungs. When blood builds up in these veins, fluid gets pushed into the air sacs of the lung. Heart failure may be caused by:  · Coronary artery disease.  · High blood pressure.  · Viral infection of the heart (myocarditis).  · Leaky or stiff heart valves.  · Irregular heartbeat (arrhythmia).  · Fluid buildup caused by kidney problems.  Other causes include:  · Infection in the lungs (pneumonia), blood (sepsis), or other part of the body.  · Severe injury to the chest.  · Lung injury from heat or toxins, such as breathing in smoke or poisonous gas.  · Inhaling vomit or water (pulmonaryaspiration).  · Certain medicines.  · High altitude.  What are the signs or symptoms?  Symptoms of this condition include:  · Shortness of breath.  · Coughing with frothy or bloody mucus.  · Wheezing.  · Feeling like you cannot get enough air.  · Shallow and fast breathing.  · Skin that is cool and damp, and has a pale or bluish color.  How is this diagnosed?  This condition is diagnosed based on:  · Your medical history.  · A physical exam.  · Your symptoms.  You may also have other tests, including:  · Chest X-ray.  · Chest CT scan.  · Blood tests, including checking the amount of oxygen in the blood.  · Sputum culture. This test checks for infection in the mucus that you cough up from your lungs.  · Electrocardiogram. This measures the electrical signals of the heart.  · Echocardiogram. This uses an ultrasound to evaluate the health of the heart.  How is this treated?  Initial treatment for this condition focuses on relieving your symptoms. Treatment depends on the underlying cause of the condition. This may include:  · Oxygen therapy. The oxygen may be given through tubes in your nose or through a face mask. In severe cases, a breathing tube is inserted into the  windpipe and hooked up to a breathing machine (ventilator).  · Medicines. These may include medicines to:  ? Help the body get rid of extra water (diuretics).  ? Help the heart pump blood properly.  ? Prevent or destroy blood clots.  If poor heart function is the cause, treatment may also include:  · Procedures to open blocked arteries, repair damaged heart valves, or remove some of the damaged heart muscle.  · A pacemaker to help with heart function.  · A procedure that uses electric shocks to regulate heart rate (cardioversion).  If an infection is the cause, treatment may include antibiotic medicines.  Follow these instructions at home:  Medicines  · Take over-the-counter and prescription medicines only as told by your health care provider.  · If you were prescribed an antibiotic, take it as told by your health care provider. Do not stop taking the antibiotic even if you start to feel better.  · Have a plan with information about each medicine you take. This should include:  ? Why you take the medicine.  ? Possible side effects.  ? Best time of day to take it.  ? Foods to take with it, or foods to avoid when taking it.  ? When to call your health care provider.  · Make a list of each medicine, vitamin, or herbal supplement you take. Keep the list with you at all times. Show it to your health care provider at each visit and before starting a new medicine. Update the list as you add or stop medicines.  Lifestyle    · Exercise regularly as told by your health care provider. It is important to do it safely. You can do this by:  ? Pacing your activities to avoid shortness of breath or chest pain.  ? Resting for at least 1 hour before and after meals.  ? Asking about cardiac rehabilitation programs. These may include education, exercise plans, and counseling.  · Eat a heart-healthy diet that is low in salt (sodium), saturated fat, and cholesterol. Your health care provider may recommend foods that are high in fiber,  such as fresh fruits and vegetables, whole grains, and beans.  · Do not use any products that contain nicotine or tobacco, such as cigarettes and e-cigarettes. If you need help quitting, ask your health care provider.  General instructions  · Maintain a healthy weight.  · Keep a record of your weight:  ? Record your hospital or clinic weight. When you get home, compare it to your scale and record your weight.  ? Weigh yourself first thing each morning after you urinate and before you eat breakfast. Wear the same amount of clothing each time. Record the weights.  ? Share your weight record with your health care provider. Daily weights are important in detecting the body's retention of excess fluid.  ? Tell your health care provider right away if you gain weight quickly. Your medicines may need to be adjusted.  · Check and record your blood pressure as often as told by your health care provider. Bring the records with you to clinic visits.  · Consider therapy or joining a support group. This may help with any stress, fear, or anxiety.  · Keep all follow-up visits as told by your health care provider. This is important.  Get help right away if:  · You gain weight quickly.  · You have severe chest pain, especially if the pain is crushing or pressure-like and spreads to the arms, back, neck, or jaw.  · You have more swelling in your hands, feet, ankles, or abdomen.  · You have nausea.  · You have unusual sweating or your skin turns blue or pale.  · Your shortness of breath gets worse.  · You have dizziness, blurred vision, a headache, or unsteadiness.  · Your blood pressure is higher than 180/120.  · You cough up bloody mucus (sputum).  · You cannot sleep because it is hard to breathe.  · You feel a racing heart beat (palpitations).  · You have anxiety or a feeling that you cannot get enough air.  These symptoms may represent a serious problem that is an emergency. Do not wait to see if the symptoms will go away. Get  medical help right away. Call your local emergency services (911 in the [place] Do not drive yourself to the hospital.  Summary  · Pulmonary edema is a condition in which fluid collects in the air sacs of your lungs. If left untreated, it can lead to a medical emergency.  · This condition is most commonly caused by heart failure. Other causes can include infections or injury to the lungs.  · Take over-the-counter and prescription medicines only as told by your health care provider.  This information is not intended to replace advice given to you by your health care provider. Make sure you discuss any questions you have with your health care provider.  Document Released: 03/09/2004 Document Revised: 02/28/2018 Document Reviewed: 02/28/2018  [employer] Interactive Patient Education © 2020 [employer]

## 2020-06-10 ENCOUNTER — HOSPITAL ENCOUNTER (OUTPATIENT)
Dept: CARDIOLOGY | Facility: HOSPITAL | Age: 65
Discharge: HOME | End: 2020-06-10

## 2020-06-10 VITALS — BODY MASS INDEX: 44.41 KG/M2 | HEIGHT: 68 IN | WEIGHT: 293 LBS

## 2020-06-10 DIAGNOSIS — R06.02 SHORTNESS OF BREATH: ICD-10-CM

## 2020-06-10 DIAGNOSIS — R60.0 BILATERAL LOWER EXTREMITY EDEMA: ICD-10-CM

## 2020-06-10 DIAGNOSIS — R07.9 CHEST PAIN, UNSPECIFIED TYPE: ICD-10-CM

## 2020-06-10 DIAGNOSIS — R93.89 ABNORMAL CHEST CT: ICD-10-CM

## 2020-06-10 PROCEDURE — 78452 HT MUSCLE IMAGE SPECT MULT: CPT | Performed by: INTERNAL MEDICINE

## 2020-06-10 PROCEDURE — 93018 CV STRESS TEST I&R ONLY: CPT | Performed by: INTERNAL MEDICINE

## 2020-06-10 PROCEDURE — A9500 TC99M SESTAMIBI: HCPCS | Performed by: INTERNAL MEDICINE

## 2020-06-10 PROCEDURE — 78452 HT MUSCLE IMAGE SPECT MULT: CPT

## 2020-06-10 PROCEDURE — 93306 TTE W/DOPPLER COMPLETE: CPT

## 2020-06-10 PROCEDURE — 0 TECHNETIUM SESTAMIBI: Performed by: INTERNAL MEDICINE

## 2020-06-10 PROCEDURE — 93016 CV STRESS TEST SUPVJ ONLY: CPT | Performed by: NURSE PRACTITIONER

## 2020-06-10 PROCEDURE — 93306 TTE W/DOPPLER COMPLETE: CPT | Performed by: INTERNAL MEDICINE

## 2020-06-10 PROCEDURE — 93017 CV STRESS TEST TRACING ONLY: CPT

## 2020-06-10 PROCEDURE — 25010000002 REGADENOSON 0.4 MG/5ML SOLUTION: Performed by: INTERNAL MEDICINE

## 2020-06-10 RX ADMIN — TECHNETIUM TC 99M SESTAMIBI 1 DOSE: 1 INJECTION INTRAVENOUS at 14:10

## 2020-06-10 RX ADMIN — TECHNETIUM TC 99M SESTAMIBI 1 DOSE: 1 INJECTION INTRAVENOUS at 15:08

## 2020-06-10 RX ADMIN — REGADENOSON 0.4 MG: 0.08 INJECTION, SOLUTION INTRAVENOUS at 15:08

## 2020-06-16 ENCOUNTER — TELEPHONE (OUTPATIENT)
Dept: CARDIOLOGY | Facility: CLINIC | Age: 65
End: 2020-06-16

## 2020-06-16 LAB
BH CV NUCLEAR PRIOR STUDY: 3
BH CV STRESS BP STAGE 1: NORMAL
BH CV STRESS COMMENTS STAGE 1: NORMAL
BH CV STRESS DOSE REGADENOSON STAGE 1: 0.4
BH CV STRESS DURATION MIN STAGE 1: 0
BH CV STRESS DURATION SEC STAGE 1: 10
BH CV STRESS HR STAGE 1: 85
BH CV STRESS PROTOCOL 1: NORMAL
BH CV STRESS RECOVERY BP: NORMAL MMHG
BH CV STRESS RECOVERY HR: 80 BPM
BH CV STRESS STAGE 1: 1
MAXIMAL PREDICTED HEART RATE: 155 BPM
PERCENT MAX PREDICTED HR: 54.84 %
STRESS BASELINE BP: NORMAL MMHG
STRESS BASELINE HR: 72 BPM
STRESS PERCENT HR: 65 %
STRESS POST PEAK BP: NORMAL MMHG
STRESS POST PEAK HR: 85 BPM
STRESS TARGET HR: 132 BPM

## 2020-06-16 NOTE — TELEPHONE ENCOUNTER
Patient informed HYACINTH Sigifredo would go over stress test on her visit in 2 weeks. Patient verbalized understanding. Meghna Turner LPN        ----- Message from BETZAIDA Blas sent at 6/16/2020  7:08 AM EDT -----  Regarding: FW:  Two week follow up questionable stress test  ----- Message -----  From: Jan Morel MD  Sent: 6/16/2020   5:57 AM EDT  To: BETZAIDA Blas

## 2020-06-23 LAB
AORTIC DIMENSIONLESS INDEX: 0.7 (DI)
BH CV ECHO MEAS - ACS: 1.5 CM
BH CV ECHO MEAS - AO MAX PG (FULL): 7.9 MMHG
BH CV ECHO MEAS - AO MAX PG: 13.7 MMHG
BH CV ECHO MEAS - AO MEAN PG (FULL): 3 MMHG
BH CV ECHO MEAS - AO MEAN PG: 6 MMHG
BH CV ECHO MEAS - AO ROOT AREA (BSA CORRECTED): 1.2
BH CV ECHO MEAS - AO ROOT AREA: 6.6 CM^2
BH CV ECHO MEAS - AO ROOT DIAM: 2.9 CM
BH CV ECHO MEAS - AO V2 MAX: 185 CM/SEC
BH CV ECHO MEAS - AO V2 MEAN: 113 CM/SEC
BH CV ECHO MEAS - AO V2 VTI: 41.3 CM
BH CV ECHO MEAS - BSA(HAYCOCK): 2.7 M^2
BH CV ECHO MEAS - BSA: 2.4 M^2
BH CV ECHO MEAS - BZI_BMI: 46.4 KILOGRAMS/M^2
BH CV ECHO MEAS - BZI_METRIC_HEIGHT: 172.7 CM
BH CV ECHO MEAS - BZI_METRIC_WEIGHT: 138.3 KG
BH CV ECHO MEAS - EDV(CUBED): 73 ML
BH CV ECHO MEAS - EDV(TEICH): 77.7 ML
BH CV ECHO MEAS - EF(CUBED): 58.4 %
BH CV ECHO MEAS - EF(TEICH): 50.4 %
BH CV ECHO MEAS - ESV(CUBED): 30.4 ML
BH CV ECHO MEAS - ESV(TEICH): 38.5 ML
BH CV ECHO MEAS - FS: 25.4 %
BH CV ECHO MEAS - IVS/LVPW: 0.9
BH CV ECHO MEAS - IVSD: 1 CM
BH CV ECHO MEAS - LA DIMENSION(2D): 3.5 CM
BH CV ECHO MEAS - LA DIMENSION: 3.6 CM
BH CV ECHO MEAS - LA/AO: 1.2
BH CV ECHO MEAS - LAT PEAK E' VEL: 8.7 CM/SEC
BH CV ECHO MEAS - LV IVRT: 0.11 SEC
BH CV ECHO MEAS - LV MASS(C)D: 152.9 GRAMS
BH CV ECHO MEAS - LV MASS(C)DI: 62.5 GRAMS/M^2
BH CV ECHO MEAS - LV MAX PG: 5.8 MMHG
BH CV ECHO MEAS - LV MEAN PG: 3 MMHG
BH CV ECHO MEAS - LV V1 MAX: 120 CM/SEC
BH CV ECHO MEAS - LV V1 MEAN: 77.9 CM/SEC
BH CV ECHO MEAS - LV V1 VTI: 38.3 CM
BH CV ECHO MEAS - LVIDD: 4.2 CM
BH CV ECHO MEAS - LVIDS: 3.1 CM
BH CV ECHO MEAS - LVPWD: 1.1 CM
BH CV ECHO MEAS - MED PEAK E' VEL: 6.9 CM/SEC
BH CV ECHO MEAS - MV A MAX VEL: 82.9 CM/SEC
BH CV ECHO MEAS - MV DEC SLOPE: 391 CM/SEC^2
BH CV ECHO MEAS - MV DEC TIME: 0.23 SEC
BH CV ECHO MEAS - MV E MAX VEL: 76.8 CM/SEC
BH CV ECHO MEAS - MV E/A: 0.93
BH CV ECHO MEAS - MV MAX PG: 5.1 MMHG
BH CV ECHO MEAS - MV MEAN PG: 2 MMHG
BH CV ECHO MEAS - MV P1/2T MAX VEL: 86.9 CM/SEC
BH CV ECHO MEAS - MV P1/2T: 65.1 MSEC
BH CV ECHO MEAS - MV V2 MAX: 113 CM/SEC
BH CV ECHO MEAS - MV V2 MEAN: 67.6 CM/SEC
BH CV ECHO MEAS - MV V2 VTI: 33.7 CM
BH CV ECHO MEAS - MVA P1/2T LCG: 2.5 CM^2
BH CV ECHO MEAS - MVA(P1/2T): 3.4 CM^2
BH CV ECHO MEAS - PA MAX PG (FULL): 0.59 MMHG
BH CV ECHO MEAS - PA MAX PG: 4.8 MMHG
BH CV ECHO MEAS - PA MEAN PG (FULL): 1 MMHG
BH CV ECHO MEAS - PA MEAN PG: 3 MMHG
BH CV ECHO MEAS - PA V2 MAX: 109 CM/SEC
BH CV ECHO MEAS - PA V2 MEAN: 74.4 CM/SEC
BH CV ECHO MEAS - PA V2 VTI: 26.5 CM
BH CV ECHO MEAS - RAP SYSTOLE: 10 MMHG
BH CV ECHO MEAS - RV MAX PG: 4.2 MMHG
BH CV ECHO MEAS - RV MEAN PG: 2 MMHG
BH CV ECHO MEAS - RV V1 MAX: 102 CM/SEC
BH CV ECHO MEAS - RV V1 MEAN: 62.5 CM/SEC
BH CV ECHO MEAS - RV V1 VTI: 23.4 CM
BH CV ECHO MEAS - RVDD: 3 CM
BH CV ECHO MEAS - RVSP: 33 MMHG
BH CV ECHO MEAS - SI(AO): 111.6 ML/M^2
BH CV ECHO MEAS - SI(CUBED): 17.4 ML/M^2
BH CV ECHO MEAS - SI(TEICH): 16 ML/M^2
BH CV ECHO MEAS - SV(AO): 272.8 ML
BH CV ECHO MEAS - SV(CUBED): 42.7 ML
BH CV ECHO MEAS - SV(TEICH): 39.2 ML
BH CV ECHO MEAS - TR MAX VEL: 237 CM/SEC
BH CV ECHO MEASUREMENTS AVERAGE E/E' RATIO: 9.85
MAXIMAL PREDICTED HEART RATE: 155 BPM
STRESS TARGET HR: 132 BPM

## 2020-06-30 ENCOUNTER — LAB (OUTPATIENT)
Dept: LAB | Facility: HOSPITAL | Age: 65
End: 2020-06-30

## 2020-06-30 ENCOUNTER — OFFICE VISIT (OUTPATIENT)
Dept: CARDIOLOGY | Facility: CLINIC | Age: 65
End: 2020-06-30

## 2020-06-30 VITALS
HEART RATE: 75 BPM | DIASTOLIC BLOOD PRESSURE: 99 MMHG | SYSTOLIC BLOOD PRESSURE: 145 MMHG | WEIGHT: 293 LBS | BODY MASS INDEX: 44.41 KG/M2 | OXYGEN SATURATION: 95 % | TEMPERATURE: 97.5 F | HEIGHT: 68 IN

## 2020-06-30 DIAGNOSIS — R07.9 CHEST PAIN, UNSPECIFIED TYPE: Primary | ICD-10-CM

## 2020-06-30 DIAGNOSIS — R60.0 BILATERAL LOWER EXTREMITY EDEMA: ICD-10-CM

## 2020-06-30 DIAGNOSIS — R93.89 ABNORMAL CHEST CT: ICD-10-CM

## 2020-06-30 DIAGNOSIS — R07.9 CHEST PAIN, UNSPECIFIED TYPE: ICD-10-CM

## 2020-06-30 DIAGNOSIS — I10 ESSENTIAL HYPERTENSION: ICD-10-CM

## 2020-06-30 DIAGNOSIS — E78.2 MIXED HYPERLIPIDEMIA: ICD-10-CM

## 2020-06-30 DIAGNOSIS — R06.02 SHORTNESS OF BREATH: ICD-10-CM

## 2020-06-30 LAB
ALBUMIN SERPL-MCNC: 4.38 G/DL (ref 3.5–5.2)
ALBUMIN/GLOB SERPL: 1.2 G/DL
ALP SERPL-CCNC: 89 U/L (ref 39–117)
ALT SERPL W P-5'-P-CCNC: 23 U/L (ref 1–33)
ANION GAP SERPL CALCULATED.3IONS-SCNC: 15.1 MMOL/L (ref 5–15)
AST SERPL-CCNC: 20 U/L (ref 1–32)
BILIRUB SERPL-MCNC: 0.7 MG/DL (ref 0.2–1.2)
BUN SERPL-MCNC: 16 MG/DL (ref 8–23)
BUN/CREAT SERPL: 20.5 (ref 7–25)
CALCIUM SPEC-SCNC: 9.6 MG/DL (ref 8.6–10.5)
CHLORIDE SERPL-SCNC: 97 MMOL/L (ref 98–107)
CHOLEST SERPL-MCNC: 127 MG/DL (ref 0–200)
CO2 SERPL-SCNC: 25.9 MMOL/L (ref 22–29)
CREAT SERPL-MCNC: 0.78 MG/DL (ref 0.57–1)
DEPRECATED RDW RBC AUTO: 48 FL (ref 37–54)
ERYTHROCYTE [DISTWIDTH] IN BLOOD BY AUTOMATED COUNT: 13.8 % (ref 12.3–15.4)
GFR SERPL CREATININE-BSD FRML MDRD: 74 ML/MIN/1.73
GLOBULIN UR ELPH-MCNC: 3.6 GM/DL
GLUCOSE SERPL-MCNC: 109 MG/DL (ref 65–99)
HCT VFR BLD AUTO: 44.4 % (ref 34–46.6)
HDLC SERPL-MCNC: 43 MG/DL (ref 40–60)
HGB BLD-MCNC: 13.6 G/DL (ref 12–15.9)
LDLC SERPL CALC-MCNC: 50 MG/DL (ref 0–100)
LDLC/HDLC SERPL: 1.16 {RATIO}
MCH RBC QN AUTO: 28.8 PG (ref 26.6–33)
MCHC RBC AUTO-ENTMCNC: 30.6 G/DL (ref 31.5–35.7)
MCV RBC AUTO: 93.9 FL (ref 79–97)
PLATELET # BLD AUTO: 318 10*3/MM3 (ref 140–450)
PMV BLD AUTO: 13.6 FL (ref 6–12)
POTASSIUM SERPL-SCNC: 4.8 MMOL/L (ref 3.5–5.2)
PROT SERPL-MCNC: 8 G/DL (ref 6–8.5)
RBC # BLD AUTO: 4.73 10*6/MM3 (ref 3.77–5.28)
SODIUM SERPL-SCNC: 138 MMOL/L (ref 136–145)
TRIGL SERPL-MCNC: 171 MG/DL (ref 0–150)
VLDLC SERPL-MCNC: 34.2 MG/DL
WBC # BLD AUTO: 13.1 10*3/MM3 (ref 3.4–10.8)

## 2020-06-30 PROCEDURE — 99214 OFFICE O/P EST MOD 30 MIN: CPT | Performed by: NURSE PRACTITIONER

## 2020-06-30 PROCEDURE — 80053 COMPREHEN METABOLIC PANEL: CPT | Performed by: NURSE PRACTITIONER

## 2020-06-30 PROCEDURE — 85027 COMPLETE CBC AUTOMATED: CPT | Performed by: NURSE PRACTITIONER

## 2020-06-30 PROCEDURE — 80061 LIPID PANEL: CPT | Performed by: NURSE PRACTITIONER

## 2020-06-30 PROCEDURE — 36415 COLL VENOUS BLD VENIPUNCTURE: CPT

## 2020-06-30 RX ORDER — LISINOPRIL 40 MG/1
40 TABLET ORAL DAILY
Qty: 90 TABLET | Refills: 3 | Status: SHIPPED | OUTPATIENT
Start: 2020-06-30 | End: 2021-06-09

## 2020-06-30 RX ORDER — ASPIRIN 81 MG/1
81 TABLET ORAL DAILY
Qty: 90 TABLET | Refills: 3 | Status: SHIPPED | OUTPATIENT
Start: 2020-06-30 | End: 2020-07-30

## 2020-06-30 RX ORDER — CLOPIDOGREL BISULFATE 75 MG/1
75 TABLET ORAL DAILY
Qty: 30 TABLET | Refills: 11 | Status: SHIPPED | OUTPATIENT
Start: 2020-06-30 | End: 2020-07-30

## 2020-07-02 ENCOUNTER — TELEPHONE (OUTPATIENT)
Dept: CARDIOLOGY | Facility: CLINIC | Age: 65
End: 2020-07-02

## 2020-07-02 NOTE — TELEPHONE ENCOUNTER
Patient informed of lab results and to consult with Dr Munoz. Patient verbalized understanding, copy of labs faxed to Dr Munoz. Patient verbalized understanding. Meghna Rajanalyssa KHAN      ----- Message from BETZAIDA Blas sent at 7/1/2020  5:49 PM EDT -----   Patient's triglycerides were slightly elevated and I would advise her to attempt lifestyle modification such as decreasing sweets, breads, and pastas.  She also had elevated white count of 13,000 however it was previously elevated higher approximately 3 years ago in her records.  I would have her discuss this with her family doctor for persisting elevated white blood cell count or evaluation if she has any signs of infection.  ----- Message -----  From: Lab, Background User  Sent: 6/30/2020   6:52 PM EDT  To: BETZAIDA Blas

## 2020-07-15 ENCOUNTER — OUTSIDE FACILITY SERVICE (OUTPATIENT)
Dept: CARDIOLOGY | Facility: CLINIC | Age: 65
End: 2020-07-15

## 2020-07-15 DIAGNOSIS — R93.89 ABNORMAL CHEST CT: ICD-10-CM

## 2020-07-15 DIAGNOSIS — R60.0 BILATERAL LOWER EXTREMITY EDEMA: ICD-10-CM

## 2020-07-15 DIAGNOSIS — I10 ESSENTIAL HYPERTENSION: ICD-10-CM

## 2020-07-15 DIAGNOSIS — R06.02 SHORTNESS OF BREATH: ICD-10-CM

## 2020-07-15 DIAGNOSIS — R07.9 CHEST PAIN, UNSPECIFIED TYPE: ICD-10-CM

## 2020-07-15 PROCEDURE — 93458 L HRT ARTERY/VENTRICLE ANGIO: CPT | Performed by: INTERNAL MEDICINE

## 2020-07-30 ENCOUNTER — OFFICE VISIT (OUTPATIENT)
Dept: CARDIOLOGY | Facility: CLINIC | Age: 65
End: 2020-07-30

## 2020-07-30 VITALS
DIASTOLIC BLOOD PRESSURE: 91 MMHG | TEMPERATURE: 97.7 F | HEART RATE: 78 BPM | HEIGHT: 68 IN | WEIGHT: 293 LBS | OXYGEN SATURATION: 94 % | SYSTOLIC BLOOD PRESSURE: 160 MMHG | BODY MASS INDEX: 44.41 KG/M2

## 2020-07-30 DIAGNOSIS — R07.9 CHEST PAIN, UNSPECIFIED TYPE: ICD-10-CM

## 2020-07-30 DIAGNOSIS — Z98.890 STATUS POST LEFT HEART CATHETERIZATION: Primary | ICD-10-CM

## 2020-07-30 DIAGNOSIS — R06.02 SHORTNESS OF BREATH: ICD-10-CM

## 2020-07-30 DIAGNOSIS — I10 ESSENTIAL HYPERTENSION: ICD-10-CM

## 2020-07-30 PROCEDURE — 99214 OFFICE O/P EST MOD 30 MIN: CPT | Performed by: NURSE PRACTITIONER

## 2020-07-30 RX ORDER — ISOSORBIDE MONONITRATE 30 MG/1
30 TABLET, EXTENDED RELEASE ORAL EVERY MORNING
Qty: 90 TABLET | Refills: 3 | Status: SHIPPED | OUTPATIENT
Start: 2020-07-30 | End: 2021-07-08 | Stop reason: SDUPTHER

## 2020-07-30 RX ORDER — AMLODIPINE BESYLATE 10 MG/1
10 TABLET ORAL DAILY
Qty: 90 TABLET | Refills: 3 | Status: SHIPPED | OUTPATIENT
Start: 2020-07-30 | End: 2021-07-08 | Stop reason: SDUPTHER

## 2020-08-06 PROBLEM — R07.9 CHEST PAIN: Status: ACTIVE | Noted: 2020-08-06

## 2020-08-06 PROBLEM — I10 ESSENTIAL HYPERTENSION: Status: ACTIVE | Noted: 2020-08-06

## 2020-08-06 PROBLEM — R06.02 SHORTNESS OF BREATH: Status: ACTIVE | Noted: 2020-08-06

## 2020-08-06 PROBLEM — Z98.890 STATUS POST LEFT HEART CATHETERIZATION: Status: ACTIVE | Noted: 2020-08-06

## 2020-11-10 ENCOUNTER — OFFICE VISIT (OUTPATIENT)
Dept: CARDIOLOGY | Facility: CLINIC | Age: 65
End: 2020-11-10

## 2020-11-10 VITALS
HEART RATE: 71 BPM | OXYGEN SATURATION: 91 % | HEIGHT: 68 IN | DIASTOLIC BLOOD PRESSURE: 91 MMHG | WEIGHT: 293 LBS | SYSTOLIC BLOOD PRESSURE: 141 MMHG | TEMPERATURE: 97.5 F | BODY MASS INDEX: 44.41 KG/M2

## 2020-11-10 DIAGNOSIS — R10.12 LEFT UPPER QUADRANT ABDOMINAL PAIN: ICD-10-CM

## 2020-11-10 DIAGNOSIS — M79.89 LEG SWELLING: ICD-10-CM

## 2020-11-10 DIAGNOSIS — R06.02 SOB (SHORTNESS OF BREATH): Primary | ICD-10-CM

## 2020-11-10 DIAGNOSIS — I10 ESSENTIAL HYPERTENSION: ICD-10-CM

## 2020-11-10 PROCEDURE — 99214 OFFICE O/P EST MOD 30 MIN: CPT | Performed by: NURSE PRACTITIONER

## 2020-11-10 RX ORDER — SPIRONOLACTONE 50 MG/1
50 TABLET, FILM COATED ORAL DAILY
Qty: 90 TABLET | Refills: 3 | Status: SHIPPED | OUTPATIENT
Start: 2020-11-10 | End: 2021-11-02

## 2020-11-10 RX ORDER — TRAMADOL HYDROCHLORIDE 50 MG/1
50 TABLET ORAL 2 TIMES DAILY PRN
COMMUNITY

## 2020-11-10 NOTE — PATIENT INSTRUCTIONS
"Fat and Cholesterol Restricted Eating Plan  Getting too much fat and cholesterol in your diet may cause health problems. Choosing the right foods helps keep your fat and cholesterol at normal levels. This can keep you from getting certain diseases.  Your doctor may recommend an eating plan that includes:  · Total fat: ______% or less of total calories a day.  · Saturated fat: ______% or less of total calories a day.  · Cholesterol: less than _________mg a day.  · Fiber: ______g a day.  What are tips for following this plan?  Meal planning  · At meals, divide your plate into four equal parts:  ? Fill one-half of your plate with vegetables and green salads.  ? Fill one-fourth of your plate with whole grains.  ? Fill one-fourth of your plate with low-fat (lean) protein foods.  · Eat fish that is high in omega-3 fats at least two times a week. This includes mackerel, tuna, sardines, and salmon.  · Eat foods that are high in fiber, such as whole grains, beans, apples, broccoli, carrots, peas, and barley.  General tips    · Work with your doctor to lose weight if you need to.  · Avoid:  ? Foods with added sugar.  ? Fried foods.  ? Foods with partially hydrogenated oils.  · Limit alcohol intake to no more than 1 drink a day for nonpregnant women and 2 drinks a day for men. One drink equals 12 oz of beer, 5 oz of wine, or 1½ oz of hard liquor.  Reading food labels  · Check food labels for:  ? Trans fats.  ? Partially hydrogenated oils.  ? Saturated fat (g) in each serving.  ? Cholesterol (mg) in each serving.  ? Fiber (g) in each serving.  · Choose foods with healthy fats, such as:  ? Monounsaturated fats.  ? Polyunsaturated fats.  ? Omega-3 fats.  · Choose grain products that have whole grains. Look for the word \"whole\" as the first word in the ingredient list.  Cooking  · Cook foods using low-fat methods. These include baking, boiling, grilling, and broiling.  · Eat more home-cooked foods. Eat at restaurants and buffets " less often.  · Avoid cooking using saturated fats, such as butter, cream, palm oil, palm kernel oil, and coconut oil.  Recommended foods    Fruits  · All fresh, canned (in natural juice), or frozen fruits.  Vegetables  · Fresh or frozen vegetables (raw, steamed, roasted, or grilled). Green salads.  Grains  · Whole grains, such as whole wheat or whole grain breads, crackers, cereals, and pasta. Unsweetened oatmeal, bulgur, barley, quinoa, or brown rice. Corn or whole wheat flour tortillas.  Meats and other protein foods  · Ground beef (85% or leaner), grass-fed beef, or beef trimmed of fat. Skinless chicken or turkey. Ground chicken or turkey. Pork trimmed of fat. All fish and seafood. Egg whites. Dried beans, peas, or lentils. Unsalted nuts or seeds. Unsalted canned beans. Nut butters without added sugar or oil.  Dairy  · Low-fat or nonfat dairy products, such as skim or 1% milk, 2% or reduced-fat cheeses, low-fat and fat-free ricotta or cottage cheese, or plain low-fat and nonfat yogurt.  Fats and oils  · Tub margarine without trans fats. Light or reduced-fat mayonnaise and salad dressings. Avocado. Olive, canola, sesame, or safflower oils.  The items listed above may not be a complete list of foods and beverages you can eat. Contact a dietitian for more information.  Foods to avoid  Fruits  · Canned fruit in heavy syrup. Fruit in cream or butter sauce. Fried fruit.  Vegetables  · Vegetables cooked in cheese, cream, or butter sauce. Fried vegetables.  Grains  · White bread. White pasta. White rice. Cornbread. Bagels, pastries, and croissants. Crackers and snack foods that contain trans fat and hydrogenated oils.  Meats and other protein foods  · Fatty cuts of meat. Ribs, chicken wings, bryan, sausage, bologna, salami, chitterlings, fatback, hot dogs, bratwurst, and packaged lunch meats. Liver and organ meats. Whole eggs and egg yolks. Chicken and turkey with skin. Fried meat.  Dairy  · Whole or 2% milk, cream,  half-and-half, and cream cheese. Whole milk cheeses. Whole-fat or sweetened yogurt. Full-fat cheeses. Nondairy creamers and whipped toppings. Processed cheese, cheese spreads, and cheese curds.  Beverages  · Alcohol. Sugar-sweetened drinks such as sodas, lemonade, and fruit drinks.  Fats and oils  · Butter, stick margarine, lard, shortening, ghee, or bryan fat. Coconut, palm kernel, and palm oils.  Sweets and desserts  · Corn syrup, sugars, honey, and molasses. Candy. Jam and jelly. Syrup. Sweetened cereals. Cookies, pies, cakes, donuts, muffins, and ice cream.  The items listed above may not be a complete list of foods and beverages you should avoid. Contact a dietitian for more information.  Summary  · Choosing the right foods helps keep your fat and cholesterol at normal levels. This can keep you from getting certain diseases.  · At meals, fill one-half of your plate with vegetables and green salads.  · Eat high-fiber foods, like whole grains, beans, apples, carrots, peas, and barley.  · Limit added sugar, saturated fats, alcohol, and fried foods.  This information is not intended to replace advice given to you by your health care provider. Make sure you discuss any questions you have with your health care provider.  Document Released: 06/18/2013 Document Revised: 08/21/2019 Document Reviewed: 09/04/2018  [employer] Patient Education © 2020 [employer]  BMI for Adults  What is BMI?  Body mass index (BMI) is a number that is calculated from a person's weight and height. BMI can help estimate how much of a person's weight is composed of fat. BMI does not measure body fat directly. Rather, it is an alternative to procedures that directly measure body fat, which can be difficult and expensive.  BMI can help identify people who may be at higher risk for certain medical problems.  What are BMI measurements used for?  BMI is used as a screening tool to identify possible weight problems. It helps determine whether a  "person is obese, overweight, a healthy weight, or underweight.  BMI is useful for:  · Identifying a weight problem that may be related to a medical condition or may increase the risk for medical problems.  · Promoting changes, such as changes in diet and exercise, to help reach a healthy weight. BMI screening can be repeated to see if these changes are working.  How is BMI calculated?  BMI involves measuring your weight in relation to your height. Both height and weight are measured, and the BMI is calculated from those numbers. This can be done either in English ([place] or metric measurements. Note that charts and online BMI calculators are available to help you find your BMI quickly and easily without having to do these calculations yourself.  To calculate your BMI in English ([place] measurements:    1. Measure your weight in pounds (lb).  2. Multiply the number of pounds by 703.  ? For example, for a person who weighs 180 lb, multiply that number by 703, which equals 126,540.  3. Measure your height in inches. Then multiply that number by itself to get a measurement called \"inches squared.\"  ? For example, for a person who is 70 inches tall, the \"inches squared\" measurement is 70 inches x 70 inches, which equals 4,900 inches squared.  4. Divide the total from step 2 (number of lb x 703) by the total from step 3 (inches squared): 126,540 ÷ 4,900 = 25.8. This is your BMI.  To calculate your BMI in metric measurements:  1. Measure your weight in kilograms (kg).  2. Measure your height in meters (m). Then multiply that number by itself to get a measurement called \"meters squared.\"  ? For example, for a person who is 1.75 m tall, the \"meters squared\" measurement is 1.75 m x 1.75 m, which is equal to 3.1 meters squared.  3. Divide the number of kilograms (your weight) by the meters squared number. In this example: 70 ÷ 3.1 = 22.6. This is your BMI.  What do the results mean?  BMI charts are used to identify whether you " are underweight, normal weight, overweight, or obese. The following guidelines will be used:  · Underweight: BMI less than 18.5.  · Normal weight: BMI between 18.5 and 24.9.  · Overweight: BMI between 25 and 29.9.  · Obese: BMI of 30 or above.  Keep these notes in mind:  · Weight includes both fat and muscle, so someone with a muscular build, such as an athlete, may have a BMI that is higher than 24.9. In cases like these, BMI is not an accurate measure of body fat.  · To determine if excess body fat is the cause of a BMI of 25 or higher, further assessments may need to be done by a health care provider.  · BMI is usually interpreted in the same way for men and women.  Where to find more information  For more information about BMI, including tools to quickly calculate your BMI, go to these websites:  · Centers for Disease Control and Prevention: www.cdc.gov  · American Heart Association: www.heart.org  · National Heart, Lung, and Blood [facility]: www.nhlbi.nih.gov  Summary  · Body mass index (BMI) is a number that is calculated from a person's weight and height.  · BMI may help estimate how much of a person's weight is composed of fat. BMI can help identify those who may be at higher risk for certain medical problems.  · BMI can be measured using English measurements or metric measurements.  · BMI charts are used to identify whether you are underweight, normal weight, overweight, or obese.  This information is not intended to replace advice given to you by your health care provider. Make sure you discuss any questions you have with your health care provider.  Document Released: 08/29/2005 Document Revised: 09/09/2020 Document Reviewed: 07/17/2020  [employer] Patient Education © 2020 [employer]    Acute Coronary Syndrome  Acute coronary syndrome (ACS) is a serious problem in which there is suddenly not enough blood and oxygen reaching the heart. ACS can result in chest pain or a heart attack.  This condition is a  medical emergency. If you have any symptoms of this condition, get help right away.  What are the causes?  This condition may be caused by:  · A buildup of fat and cholesterol inside the arteries (atherosclerosis). This is the most common cause. The buildup (plaque) can cause blood vessels in the heart (coronary arteries) to become narrow or blocked, which reduces blood flow to the heart. Plaque can also break off and lead to a clot, which can block an artery and cause a heart attack or stroke.  · Sudden tightening of the muscles around the coronary arteries (coronary spasm).  · Tearing of a coronary artery (spontaneous coronary artery dissection).  · Very low blood pressure (hypotension).  · An abnormal heartbeat (arrhythmia).  · Other medical conditions that cause a decrease of oxygen to the heart, such as anemiaorrespiratory failure.  · Using cocaine or methamphetamine.  What increases the risk?  The following factors may make you more likely to develop this condition:  · Age. The risk for ACS increases as you get older.  · History of chest pain, heart attack, peripheral artery disease, or stroke.  · Having taken chemotherapy or immune-suppressing medicines.  · Being male.  · Family history of chest pain, heart disease, or stroke.  · Smoking.  · Not exercising enough.  · Being overweight.  · High cholesterol.  · High blood pressure (hypertension).  · Diabetes.  · Excessive alcohol use.  What are the signs or symptoms?  Common symptoms of this condition include:  · Chest pain. The pain may last a long time, or it may stop and come back (recur). It may feel like:  ? Crushing or squeezing.  ? Tightness, pressure, fullness, or heaviness.  · Arm, neck, jaw, or back pain.  · Heartburn or indigestion.  · Shortness of breath.  · Nausea.  · Sudden cold sweats.  · Light-headedness.  · Dizziness or passing out.  · Tiredness (fatigue).  Sometimes there are no symptoms.  How is this diagnosed?  This condition may be diagnosed  based on:  · Your medical history and symptoms.  · Imaging tests, such as:  ? An electrocardiogram (ECG). This measures the heart's electrical activity.  ? X-rays.  ? CT scan.  ? A coronary angiogram. For this test, dye is injected into the heart arteries and then X-rays are taken.  ? Myocardial perfusion imaging. This test shows how well blood flows through your heart muscle.  · Blood tests. These may be repeated at certain time intervals.  · Exercise stress testing.  · Echocardiogram. This is a test that uses sound waves to produce detailed images of the heart.  How is this treated?  Treatment for this condition may include:  · Oxygen therapy.  · Medicines, such as:  ? Antiplatelet medicines and blood-thinning medicines, such as aspirin. These help prevent blood clots.  ? Medicine that dissolves any blood clots (fibrinolytic therapy).  ? Blood pressure medicines.  ? Nitroglycerin. This helps widen blood vessels to improve blood flow.  ? Pain medicine.  ? Cholesterol-lowering medicine.  · Surgery, such as:  ? Coronary angioplasty with stent placement. This involves placing a small piece of metal that looks like mesh or a spring into a narrow coronary artery. This widens the artery and keeps it open.  ? Coronary artery bypass surgery. This involves taking a section of a blood vessel from a different part of your body and placing it on the blocked coronary artery to allow blood to flow around the blockage.  · Cardiac rehabilitation. This is a program that includes exercise training, education, and counseling to help you recover.  Follow these instructions at home:  Eating and drinking  · Eat a heart-healthy diet that includes whole grains, fruits and vegetables, lean proteins, and low-fat or nonfat dairy products.  · Limit how much salt (sodium) you eat as told by your health care provider. Follow instructions from your health care provider about any other eating or drinking restrictions, such as limiting foods that  are high in fat and processed sugars.  · Use healthy cooking methods such as roasting, grilling, broiling, baking, poaching, steaming, or stir-frying.  · Work with a dietitian to follow a heart-healthy eating plan.  Medicines  · Take over-the-counter and prescription medicines only as told by your health care provider.  · Do not take these medicines unless your health care provider approves:  ? Vitamin supplements that contain vitamin A or vitamin E.  ? NSAIDs, such as ibuprofen, naproxen, or celecoxib.  ? Hormone replacement therapy that contains estrogen.  · If you are taking blood thinners:  ? Talk with your health care provider before you take any medicines that contain aspirin or NSAIDs. These medicines increase your risk for dangerous bleeding.  ? Take your medicine exactly as told, at the same time every day.  ? Avoid activities that could cause injury or bruising, and follow instructions about how to prevent falls.  ? Wear a medical alert bracelet, and carry a card that lists what medicines you take.  Activity  · Follow your cardiac rehabilitation program. Do exercises as told by your physical therapist.  · Ask your health care provider what activities and exercises are safe for you. Follow his or her instructions about lifting, driving, or climbing stairs.  Lifestyle  · Do not use any products that contain nicotine or tobacco, such as cigarettes, e-cigarettes, and chewing tobacco. If you need help quitting, ask your health care provider.  · Do not drink alcohol if your health care provider tells you not to drink.  · If you drink alcohol:  ? Limit how much you have to 0-1 drink a day.  ? Be aware of how much alcohol is in your drink. In the [place] one drink equals one 12 oz bottle of beer (355 mL), one 5 oz glass of wine (148 mL), or one 1½ oz glass of hard liquor (44 mL).  · Maintain a healthy weight. If you need to lose weight, work with your health care provider to do so safely.  General  instructions  · Tell all the health care providers who provide care for you about your heart condition, including your dentist. This may affect the medicines or treatment you receive.  · Manage any other health conditions you have, such as hypertension or diabetes. These conditions affect your heart.  · Pay attention to your mental health. You may be at higher risk for depression.  ? Find ways to manage stress.  ? Talk to your health care provider about depression screening and treatment.  · Keep your vaccinations up to date.  ? Get the flu shot (influenza vaccine) every year.  ? Get the pneumococcal vaccine if you are age 65 or older.  · If directed, monitor your blood pressure at home.  · Keep all follow-up visits as told by your health care provider. This is important.  Contact a health care provider if you:  · Feel overwhelmed or sad.  · Have trouble doing your daily activities.  Get help right away if you:  · Have pain in your chest, neck, arm, jaw, stomach, or back that recurs, and:  ? It lasts for more than a few minutes.  ? It is not relieved by taking the medicineyour health care provider prescribed.  · Have unexplained:  ? Heavy sweating.  ? Heartburn or indigestion.  ? Nausea or vomiting.  ? Shortness of breath.  ? Difficulty breathing.  ? Fatigue.  ? Nervousness or anxiety.  ? Weakness.  ? Diarrhea.  ? Dark stools or blood in your stool.  · Have sudden light-headedness or dizziness.  · Have blood pressure that is higher than 180/120.  · Faint.  · Have thoughts about hurting yourself.  These symptoms may represent a serious problem that is an emergency. Do not wait to see if the symptoms will go away. Get medical help right away. Call your local emergency services (911 in the [place] Do not drive yourself to the hospital.   Summary  · Acute coronary syndrome (ACS) is when there is not enough blood and oxygen being supplied to the heart. ACS can result in chest pain or a heart attack.  · Acute coronary  syndrome is a medical emergency. If you have any symptoms of this condition, get help right away.  · Treatment includes medicines and procedures to open the blocked arteries and restore blood flow.  This information is not intended to replace advice given to you by your health care provider. Make sure you discuss any questions you have with your health care provider.  Document Released: 12/18/2006 Document Revised: 05/20/2020 Document Reviewed: 12/30/2019  Elsevier Patient Education © 2020 [employer]

## 2020-11-10 NOTE — PROGRESS NOTES
Subjective     Arlette Felder is a 65 y.o. female who presents to day for Hypertension.    CHIEF COMPLIANT  Chief Complaint   Patient presents with   • Hypertension       Active Problems:  Problem List Items Addressed This Visit        Cardiovascular and Mediastinum    Essential hypertension    Relevant Medications    spironolactone (Aldactone) 50 MG tablet      Other Visit Diagnoses     SOB (shortness of breath)    -  Primary    Left upper quadrant abdominal pain        Leg swelling        Relevant Medications    spironolactone (Aldactone) 50 MG tablet      Problem list  1.  Chest pain  1.1 stress test 6/20: Anterior wall ischemic defect on tomographic images which is most likely breast attenuation but cannot rule out mild anterior wall ischemia, elevated transischemic dilation ratio of 1.24 and a post-rest ejection fraction of 76%  1.2 left heart catheterization 7/20: Left main normal, circumflex normal, LAD normal, RCA normal, EF 65%, 16-18 LVEDP.  2.  Shortness of breath  2.1 echocardiogram 6/20: LV wall thickness in the upper limits of normal, grade 1 diastolic dysfunction, trivial MR and TR, pulmonary artery pressures of 30 to 35 mmHg, EF 56 to 60%  3.  Palpitations  4.  Lower extremity edema       HPI  HPI  Ms. Felder is a 65-year-old female patient who is being followed up today for chronic arterial hypertension and shortness of breath.  Patient does have chronic arterial hypertension with her blood pressure is 141/91 heart rate is 71 she denies any associated symptoms with her hypertension.  She is currently taking spironolactone, metoprolol, lisinopril, and amlodipine for her hypertension.  Her blood pressure today is 141/91 with a heart rate of 71.  She states that is commonly in the 140s at home.  She did have negative coronary arteries with a preserved ejection fraction.  Patient has had no more anginal type chest pain since last follow-up.  She does report chronic shortness of breath is worse with  exertion even occurs at rest at times she does use chronic O2 therapy and followed by pulmonology.  Her O2 therapy is mainly at night.  She does have some level PND.  She also has some bilateral lower extremity edema that is usually resolves overnight.  However her symptoms have improved since the heart catheterization and she does report that she is feeling better.  She does complain of chronic left upper quadrant abdominal pain without nausea vomiting.  Occurs at random and is intermittent in nature.  Only last a few seconds it feels like a squeezing type sensation where someone has her hand and they are squeezing her stomach.  It is nontender to the touch and bowel sounds are present in all 4 quadrants.  She also reports rare palpitations where her heart races.  She also has chronic fatigue where she gets tired easily.  Patient denies any chest pain, syncope, orthopnea, or other neurological problems.  PRIOR MEDS  Current Outpatient Medications on File Prior to Visit   Medication Sig Dispense Refill   • amLODIPine (NORVASC) 10 MG tablet Take 1 tablet by mouth Daily. 90 tablet 3   • diclofenac (VOLTAREN) 75 MG EC tablet Take 75 mg by mouth 2 (Two) Times a Day.     • fluticasone (FLONASE) 50 MCG/ACT nasal spray 1 spray into each nostril Daily.     • gabapentin (NEURONTIN) 100 MG capsule Take 100 mg by mouth 2 (Two) Times a Day.     • isosorbide mononitrate (IMDUR) 30 MG 24 hr tablet Take 1 tablet by mouth Every Morning. 90 tablet 3   • lisinopril (PRINIVIL,ZESTRIL) 40 MG tablet Take 1 tablet by mouth Daily. 90 tablet 3   • metoprolol tartrate (LOPRESSOR) 100 MG tablet Take 100 mg by mouth 2 (Two) Times a Day.     • mirtazapine (REMERON) 30 MG tablet Take 30 mg by mouth Every Night.     • nitroglycerin (NITROSTAT) 0.4 MG SL tablet 1 under the tongue as needed for angina, may repeat q5mins for up three doses 30 tablet 5   • O2 (OXYGEN) Inhale 2 L/min Every Night.     • rosuvastatin (CRESTOR) 20 MG tablet Take 20 mg  by mouth Daily.     • TiZANidine (Zanaflex) 4 MG capsule Take 4 mg by mouth 2 (Two) Times a Day.     • traMADol (ULTRAM) 50 MG tablet Take 50 mg by mouth 2 (Two) Times a Day As Needed for Moderate Pain .     • venlafaxine (EFFEXOR) 75 MG tablet Take 75 mg by mouth Daily.     • ziprasidone (Geodon) 60 MG capsule Take 60 mg by mouth Daily.     • [DISCONTINUED] spironolactone (ALDACTONE) 25 MG tablet Take 1 tablet by mouth Daily. 30 tablet 11   • [DISCONTINUED] hydrOXYzine (ATARAX) 50 MG tablet Take 1 tablet by mouth Every 6 (Six) Hours As Needed for anxiety. (Patient taking differently: Take 50 mg by mouth 2 (Two) Times a Day.) 90 tablet 0     No current facility-administered medications on file prior to visit.        ALLERGIES  Sulfa antibiotics    HISTORY  Past Medical History:   Diagnosis Date   • Acid reflux    • Anxiety    • Asthma    • Bipolar disorder (CMS/[facility])    • Chronic pain disorder     back pain   • Depression    • Environmental allergies    • Hyperlipidemia    • Hypertension    • Insomnia    • Rheumatic fever    • Suicidal thoughts    • Suicide attempt (CMS/[facility])        Social History     Socioeconomic History   • Marital status:      Spouse name: Not on file   • Number of children: Not on file   • Years of education: Not on file   • Highest education level: Not on file   Tobacco Use   • Smoking status: Never Smoker   • Smokeless tobacco: Never Used   Substance and Sexual Activity   • Alcohol use: No   • Drug use: No     Comment: reports taking only prescribed medications   • Sexual activity: Never       Family History   Problem Relation Age of Onset   • Bipolar disorder Paternal Grandfather    • Anxiety disorder Mother    • Depression Mother    • Heart disease Mother    • Hypertension Mother    • Alcohol abuse Father    • Heart disease Father    • Hypertension Father    • No Known Problems Sister        Review of Systems   Constitutional: Positive for fatigue. Negative for chills and fever.  "  HENT: Positive for sinus pressure. Negative for congestion and sore throat.    Eyes: Positive for visual disturbance (glasses).   Respiratory: Positive for shortness of breath. Negative for chest tightness.    Cardiovascular: Positive for palpitations (races at times , not often) and leg swelling. Negative for chest pain.   Gastrointestinal: Positive for abdominal pain (LUQ). Negative for blood in stool, constipation, diarrhea, nausea and vomiting.   Endocrine: Negative.  Negative for cold intolerance and heat intolerance.   Genitourinary: Positive for frequency and urgency. Negative for dysuria and hematuria.   Musculoskeletal: Positive for back pain (surgery this year). Negative for arthralgias and neck pain.   Skin: Negative.  Negative for rash and wound.   Allergic/Immunologic: Positive for environmental allergies.   Neurological: Positive for dizziness (at times when up moving around). Negative for syncope and light-headedness.   Hematological: Negative.  Does not bruise/bleed easily.   Psychiatric/Behavioral: Positive for sleep disturbance (oxygen, wakes with soa, denies cp).       Objective     VITALS: /91 (BP Location: Left arm, Patient Position: Sitting)   Pulse 71   Temp 97.5 °F (36.4 °C)   Ht 172.7 cm (68\")   Wt 136 kg (300 lb)   SpO2 91%   BMI 45.61 kg/m²     LABS:   Lab Results (most recent)     None          IMAGING:   No Images in the past 120 days found..    EXAM:  Physical Exam  Vitals signs and nursing note reviewed.   Constitutional:       Appearance: Normal appearance. She is well-developed.   HENT:      Head: Normocephalic and atraumatic.   Eyes:      Pupils: Pupils are equal, round, and reactive to light.   Neck:      Musculoskeletal: Neck supple.      Thyroid: No thyroid mass.      Vascular: No carotid bruit or JVD.      Trachea: Trachea and phonation normal.   Cardiovascular:      Rate and Rhythm: Normal rate and regular rhythm.      Pulses:           Radial pulses are 2+ on the " right side and 2+ on the left side.        Posterior tibial pulses are 2+ on the right side and 2+ on the left side.      Heart sounds: Normal heart sounds. No murmur. No friction rub. No gallop.    Pulmonary:      Effort: Pulmonary effort is normal. No respiratory distress.      Breath sounds: Normal breath sounds. No wheezing or rales.   Abdominal:      General: Bowel sounds are normal. There is no distension or abdominal bruit.      Palpations: Abdomen is soft.      Tenderness: There is no abdominal tenderness.   Musculoskeletal: Normal range of motion.         General: Swelling present.   Skin:     General: Skin is warm and dry.      Capillary Refill: Capillary refill takes less than 2 seconds.      Findings: No rash.   Neurological:      Mental Status: She is alert and oriented to person, place, and time.      Cranial Nerves: No cranial nerve deficit.      Sensory: No sensory deficit.   Psychiatric:         Mood and Affect: Mood normal.         Speech: Speech normal.         Behavior: Behavior normal.         Thought Content: Thought content normal.         Judgment: Judgment normal.         Procedure   Procedures       Assessment/Plan    Diagnosis Plan   1. SOB (shortness of breath)     2. Essential hypertension  spironolactone (Aldactone) 50 MG tablet   3. Left upper quadrant abdominal pain     4. Leg swelling  spironolactone (Aldactone) 50 MG tablet   .  Patient does have chronic shortness of breath seems to be stable and nonprogressive.  She did have a negative heart cath with relatively normal coronary arteries and served systolic heart function.  No further work-up is needed at this time.  2.  Patient does have essential hypertension which her blood pressure is elevated today and seems to be elevated at home as well per her. we will increase her spironolactone to 50 mg daily.  She will get a BMP checked in approximately 2 weeks to evaluate kidney function.  She will continue to monitor blood pressure on  routine basis and report any significant highs or lows.  This will also help with her chronic lower extremity edema as well.  3.  Patient does have left upper quadrant pain that is chronic in nature.  It is nontender to touch and does not seem to be an acute situation.  I did advise her to follow-up with her PCP for further evaluation and management.  4.  Informed of signs and symptoms of ACS and advised to seek emergent treatment for any new worsening symptoms.  Patient also advised sooner follow-up as needed.  Also advised to follow-up with family doctor as needed  This note is dictated utilizing voice recognition software.  Although this record has been proof read, transcriptional errors may still be present. If questions occur regarding the content of this record please do not hesitate to call our office.  I have reviewed and confirmed the accuracy of the ROS as documented by the MA/LPN/RN BETZAIDA Blas  Return in about 6 months (around 5/10/2021), or if symptoms worsen or fail to improve.    Diagnoses and all orders for this visit:    1. SOB (shortness of breath) (Primary)    2. Essential hypertension  -     spironolactone (Aldactone) 50 MG tablet; Take 1 tablet by mouth Daily.  Dispense: 90 tablet; Refill: 3    3. Left upper quadrant abdominal pain    4. Leg swelling  -     spironolactone (Aldactone) 50 MG tablet; Take 1 tablet by mouth Daily.  Dispense: 90 tablet; Refill: 3        Arlette Felder  reports that she has never smoked. She has never used smokeless tobacco..         Patient's Body mass index is 45.61 kg/m². BMI is above normal parameters. Recommendations include: educational material.           MEDS ORDERED DURING VISIT:  New Medications Ordered This Visit   Medications   • spironolactone (Aldactone) 50 MG tablet     Sig: Take 1 tablet by mouth Daily.     Dispense:  90 tablet     Refill:  3           This document has been electronically signed by BETZAIDA Blas Jr.  November 10, 2020  10:45 EST

## 2021-06-09 DIAGNOSIS — I10 ESSENTIAL HYPERTENSION: ICD-10-CM

## 2021-06-09 DIAGNOSIS — R07.9 CHEST PAIN, UNSPECIFIED TYPE: ICD-10-CM

## 2021-06-09 RX ORDER — LISINOPRIL 40 MG/1
40 TABLET ORAL DAILY
Qty: 90 TABLET | Refills: 2 | Status: SHIPPED | OUTPATIENT
Start: 2021-06-09 | End: 2022-03-02

## 2021-06-09 RX ORDER — ASPIRIN 81 MG/1
TABLET, COATED ORAL
Qty: 90 TABLET | Refills: 2 | OUTPATIENT
Start: 2021-06-09

## 2021-07-07 DIAGNOSIS — R07.9 CHEST PAIN, UNSPECIFIED TYPE: ICD-10-CM

## 2021-07-07 DIAGNOSIS — I10 ESSENTIAL HYPERTENSION: ICD-10-CM

## 2021-07-08 RX ORDER — AMLODIPINE BESYLATE 10 MG/1
10 TABLET ORAL DAILY
Qty: 90 TABLET | Refills: 2 | Status: SHIPPED | OUTPATIENT
Start: 2021-07-08 | End: 2022-05-04

## 2021-07-08 RX ORDER — ISOSORBIDE MONONITRATE 30 MG/1
30 TABLET, EXTENDED RELEASE ORAL EVERY MORNING
Qty: 90 TABLET | Refills: 2 | Status: SHIPPED | OUTPATIENT
Start: 2021-07-08 | End: 2022-05-04

## 2021-11-02 DIAGNOSIS — M79.89 LEG SWELLING: ICD-10-CM

## 2021-11-02 DIAGNOSIS — I10 ESSENTIAL HYPERTENSION: ICD-10-CM

## 2021-11-02 RX ORDER — SPIRONOLACTONE 50 MG/1
50 TABLET, FILM COATED ORAL DAILY
Qty: 90 TABLET | Refills: 2 | Status: SHIPPED | OUTPATIENT
Start: 2021-11-02

## 2022-03-02 DIAGNOSIS — I10 ESSENTIAL HYPERTENSION: ICD-10-CM

## 2022-03-02 RX ORDER — LISINOPRIL 40 MG/1
40 TABLET ORAL DAILY
Qty: 90 TABLET | Refills: 1 | Status: SHIPPED | OUTPATIENT
Start: 2022-03-02

## 2022-05-04 DIAGNOSIS — I10 ESSENTIAL HYPERTENSION: ICD-10-CM

## 2022-05-04 DIAGNOSIS — R07.9 CHEST PAIN, UNSPECIFIED TYPE: ICD-10-CM

## 2022-05-04 RX ORDER — AMLODIPINE BESYLATE 10 MG/1
10 TABLET ORAL DAILY
Qty: 90 TABLET | Refills: 1 | Status: SHIPPED | OUTPATIENT
Start: 2022-05-04

## 2022-05-04 RX ORDER — ISOSORBIDE MONONITRATE 30 MG/1
30 TABLET, EXTENDED RELEASE ORAL EVERY MORNING
Qty: 90 TABLET | Refills: 1 | Status: SHIPPED | OUTPATIENT
Start: 2022-05-04 | End: 2022-11-17

## 2022-08-09 DIAGNOSIS — I10 ESSENTIAL HYPERTENSION: ICD-10-CM

## 2022-08-09 RX ORDER — LISINOPRIL 40 MG/1
40 TABLET ORAL DAILY
Qty: 90 TABLET | Refills: 0 | OUTPATIENT
Start: 2022-08-09

## 2022-11-12 DIAGNOSIS — R07.9 CHEST PAIN, UNSPECIFIED TYPE: ICD-10-CM

## 2022-11-14 RX ORDER — ISOSORBIDE MONONITRATE 30 MG/1
30 TABLET, EXTENDED RELEASE ORAL EVERY MORNING
Qty: 90 TABLET | Refills: 0 | OUTPATIENT
Start: 2022-11-14

## 2022-11-16 DIAGNOSIS — R07.9 CHEST PAIN, UNSPECIFIED TYPE: ICD-10-CM

## 2022-11-17 RX ORDER — ISOSORBIDE MONONITRATE 30 MG/1
30 TABLET, EXTENDED RELEASE ORAL EVERY MORNING
Qty: 90 TABLET | Refills: 0 | Status: SHIPPED | OUTPATIENT
Start: 2022-11-17 | End: 2023-03-06 | Stop reason: SDUPTHER

## 2023-01-27 DIAGNOSIS — R07.9 CHEST PAIN, UNSPECIFIED TYPE: ICD-10-CM

## 2023-01-27 RX ORDER — ISOSORBIDE MONONITRATE 30 MG/1
30 TABLET, EXTENDED RELEASE ORAL EVERY MORNING
Qty: 90 TABLET | Refills: 0 | OUTPATIENT
Start: 2023-01-27

## 2023-02-24 DIAGNOSIS — R07.9 CHEST PAIN, UNSPECIFIED TYPE: ICD-10-CM

## 2023-02-24 RX ORDER — ISOSORBIDE MONONITRATE 30 MG/1
30 TABLET, EXTENDED RELEASE ORAL EVERY MORNING
Qty: 90 TABLET | Refills: 0 | OUTPATIENT
Start: 2023-02-24

## 2023-03-06 ENCOUNTER — TELEPHONE (OUTPATIENT)
Dept: CARDIOLOGY | Facility: CLINIC | Age: 68
End: 2023-03-06
Payer: MEDICARE

## 2023-03-06 DIAGNOSIS — R07.9 CHEST PAIN, UNSPECIFIED TYPE: ICD-10-CM

## 2023-03-06 RX ORDER — ISOSORBIDE MONONITRATE 30 MG/1
30 TABLET, EXTENDED RELEASE ORAL EVERY MORNING
Qty: 90 TABLET | Refills: 0 | Status: SHIPPED | OUTPATIENT
Start: 2023-03-06

## 2023-03-06 NOTE — TELEPHONE ENCOUNTER
Patient would like refill of Isosorbide to [facility]. She has not been seen since 11/2020. Advised I will route message to provider for refill and front office for appointment.     Per , refill 90 day supply. Patient must keep follow up for future refills.

## 2023-04-28 ENCOUNTER — OFFICE VISIT (OUTPATIENT)
Dept: CARDIOLOGY | Facility: CLINIC | Age: 68
End: 2023-04-28
Payer: MEDICARE

## 2023-04-28 VITALS
SYSTOLIC BLOOD PRESSURE: 140 MMHG | DIASTOLIC BLOOD PRESSURE: 88 MMHG | BODY MASS INDEX: 40.88 KG/M2 | HEIGHT: 69 IN | OXYGEN SATURATION: 94 % | HEART RATE: 79 BPM | WEIGHT: 276 LBS

## 2023-04-28 DIAGNOSIS — R07.9 CHEST PAIN, UNSPECIFIED TYPE: Primary | ICD-10-CM

## 2023-04-28 DIAGNOSIS — R00.2 PALPITATIONS: ICD-10-CM

## 2023-04-28 DIAGNOSIS — I10 ESSENTIAL HYPERTENSION: ICD-10-CM

## 2023-04-28 DIAGNOSIS — R06.02 SHORTNESS OF BREATH: ICD-10-CM

## 2023-04-28 PROCEDURE — 1160F RVW MEDS BY RX/DR IN RCRD: CPT | Performed by: NURSE PRACTITIONER

## 2023-04-28 PROCEDURE — 1159F MED LIST DOCD IN RCRD: CPT | Performed by: NURSE PRACTITIONER

## 2023-04-28 PROCEDURE — 99214 OFFICE O/P EST MOD 30 MIN: CPT | Performed by: NURSE PRACTITIONER

## 2023-04-28 PROCEDURE — 3077F SYST BP >= 140 MM HG: CPT | Performed by: NURSE PRACTITIONER

## 2023-04-28 PROCEDURE — 3079F DIAST BP 80-89 MM HG: CPT | Performed by: NURSE PRACTITIONER

## 2023-04-28 RX ORDER — AMITRIPTYLINE HYDROCHLORIDE 150 MG/1
150 TABLET, FILM COATED ORAL NIGHTLY
COMMUNITY

## 2023-04-28 RX ORDER — HYDROXYZINE 50 MG/1
50 TABLET, FILM COATED ORAL 3 TIMES DAILY PRN
COMMUNITY

## 2023-04-28 RX ORDER — ISOSORBIDE MONONITRATE 30 MG/1
30 TABLET, EXTENDED RELEASE ORAL EVERY MORNING
Qty: 90 TABLET | Refills: 3 | Status: SHIPPED | OUTPATIENT
Start: 2023-04-28

## 2023-04-28 NOTE — PROGRESS NOTES
Subjective     Arlette Felder is a 67 y.o. female who presents to day for Follow-up, Shortness of Breath, and Hypertension.    CHIEF COMPLIANT  Chief Complaint   Patient presents with   • Follow-up   • Shortness of Breath   • Hypertension       Active Problems:  Problem List Items Addressed This Visit        Cardiac and Vasculature    Essential hypertension    Chest pain - Primary    Relevant Medications    isosorbide mononitrate (IMDUR) 30 MG 24 hr tablet       Pulmonary and Pneumonias    Shortness of breath   Other Visit Diagnoses     Palpitations               Problem list  1.  Chest pain  1.1 stress test 6/20: Anterior wall ischemic defect on tomographic images which is most likely breast attenuation but cannot rule out mild anterior wall ischemia, elevated transischemic dilation ratio of 1.24 and a post-rest ejection fraction of 76%  1.2 left heart catheterization 7/20: Left main normal, circumflex normal, LAD normal, RCA normal, EF 65%, 16-18 LVEDP.  2.  Shortness of breath  2.1 echocardiogram 6/20: LV wall thickness in the upper limits of normal, grade 1 diastolic dysfunction, trivial MR and TR, pulmonary artery pressures of 30 to 35 mmHg, EF 56 to 60%  3.  Palpitations  4.  Lower extremity edema      HPI  Shortness of Breath  Associated symptoms include chest pain (mild  only happen on rare occasions) and leg swelling. Pertinent negatives include no fever, neck pain, rhinorrhea or sore throat.   Hypertension  Associated symptoms include chest pain (mild  only happen on rare occasions), palpitations (has been very few) and shortness of breath. Pertinent negatives include no neck pain.   Arlette Felder is a 67-year-old female patient being followed up today for dyspnea and chronic arterial hypertension. She does have chronic arterial hypertension where she is on Norvasc, metoprolol, lisinopril. Today, her blood pressure is 140/80 mmHg. She also went under a left heart catheterization in 07/2020 that showed  normal coronary arteries, preserved ejection fraction of 65 percent with an LVEDP of 16 to 18 percent. She is on antianginal therapy of Imdur and statin therapy of Crestor.    The patient states that she is doing well. She reports that she is still experiencing chest pain under her left breast. She notes that it lasts for a few seconds. She states that she would feel uncomfortable and then it would resolve. She reports that it also causes chest tightness. She denies any other symptoms. She states that she does not get active much because she can not walk. She reports that her dyspnea is mainly when she is walking or getting up or down. She denies experiencing dyspnea when she is sitting. She states that she also feels palpitations. She notes that she feels them approximately once every 3 to 4 days. She reports that she experiences dizziness every time she gets up and down. She states that she drinks about 60 to 80 ounces of water daily. She reports that when she was having her back surgery, there were a couple of times during the night that she had trouble with her heart or breathing. She notes that she was sent to Dr. Taylor and she informed her that she believed that she had congestive heart failure. A previous stress test showed diastolic dysfunction, Grade 1. She states that she was prescribed spironolactone but she has not been taking it because she has not experienced edema. She reports that she takes 1 or 2 tablets if her edema is present. She states that she has not been tested for sleep apnea. She notes that she was not using her oxygen at night so she gave it away because she felt like she did not need it.    Regarding family history of cancer, her  passed away from lung cancer. She notes that her uncle had colon cancer.    PRIOR MEDS  Current Outpatient Medications on File Prior to Visit   Medication Sig Dispense Refill   • amitriptyline (ELAVIL) 150 MG tablet Take 1 tablet by mouth Every Night.      • amLODIPine (NORVASC) 10 MG tablet TAKE 1 TABLET BY MOUTH DAILY. 90 tablet 1   • diclofenac (VOLTAREN) 75 MG EC tablet Take 1 tablet by mouth 2 (Two) Times a Day.     • fluticasone (FLONASE) 50 MCG/ACT nasal spray 1 spray into the nostril(s) as directed by provider Daily.     • gabapentin (NEURONTIN) 100 MG capsule Take 1 capsule by mouth 2 (Two) Times a Day.     • hydrOXYzine (ATARAX) 50 MG tablet Take 1 tablet by mouth 3 (Three) Times a Day As Needed for Itching.     • lisinopril (PRINIVIL,ZESTRIL) 40 MG tablet TAKE 1 TABLET BY MOUTH DAILY. 90 tablet 1   • metoprolol tartrate (LOPRESSOR) 100 MG tablet Take 1 tablet by mouth 2 (Two) Times a Day.     • nitroglycerin (NITROSTAT) 0.4 MG SL tablet 1 under the tongue as needed for angina, may repeat q5mins for up three doses 30 tablet 5   • rosuvastatin (CRESTOR) 20 MG tablet Take 1 tablet by mouth Daily.     • TiZANidine (ZANAFLEX) 4 MG capsule Take 1 capsule by mouth 2 (Two) Times a Day.     • traMADol (ULTRAM) 50 MG tablet Take 1 tablet by mouth 2 (Two) Times a Day As Needed for Moderate Pain.     • venlafaxine (EFFEXOR) 75 MG tablet Take 1 tablet by mouth Daily.     • ziprasidone (GEODON) 80 MG capsule Take 1 capsule by mouth Daily.       No current facility-administered medications on file prior to visit.       ALLERGIES  Sulfa antibiotics    HISTORY  Past Medical History:   Diagnosis Date   • Acid reflux    • Anxiety    • Asthma    • Bipolar disorder    • Chronic pain disorder     back pain   • Depression    • Environmental allergies    • Hyperlipidemia    • Hypertension    • Insomnia    • Rheumatic fever    • Suicidal thoughts    • Suicide attempt        Social History     Socioeconomic History   • Marital status:    Tobacco Use   • Smoking status: Never   • Smokeless tobacco: Never   Substance and Sexual Activity   • Alcohol use: No   • Drug use: No     Comment: reports taking only prescribed medications   • Sexual activity: Never       Family History  "  Problem Relation Age of Onset   • Bipolar disorder Paternal Grandfather    • Anxiety disorder Mother    • Depression Mother    • Heart disease Mother    • Hypertension Mother    • Alcohol abuse Father    • Heart disease Father    • Hypertension Father    • No Known Problems Sister        Review of Systems   Constitutional: Negative for chills, fatigue and fever.   HENT: Negative for congestion, rhinorrhea and sore throat.    Respiratory: Positive for shortness of breath. Negative for chest tightness.    Cardiovascular: Positive for chest pain (mild  only happen on rare occasions), palpitations (has been very few) and leg swelling.   Gastrointestinal: Negative for constipation, diarrhea and nausea.   Musculoskeletal: Positive for arthralgias and back pain. Negative for neck pain.   Allergic/Immunologic: Positive for environmental allergies. Negative for food allergies.   Neurological: Positive for dizziness (getting up and down), weakness and light-headedness. Negative for syncope.   Hematological: Bruises/bleeds easily.   Psychiatric/Behavioral: Negative for sleep disturbance.       Objective     VITALS: /88 (BP Location: Left arm, Patient Position: Sitting, Cuff Size: Adult)   Pulse 79   Ht 175.3 cm (69\")   Wt 125 kg (276 lb)   SpO2 94%   BMI 40.76 kg/m²     LABS:   Lab Results (most recent)     None          IMAGING:   No Images in the past 120 days found..    EXAM:  Physical Exam  Vitals and nursing note reviewed.   Constitutional:       Appearance: She is well-developed.   HENT:      Head: Normocephalic.   Eyes:      Pupils: Pupils are equal, round, and reactive to light.   Neck:      Thyroid: No thyroid mass.      Vascular: No carotid bruit or JVD.      Trachea: Trachea and phonation normal.   Cardiovascular:      Rate and Rhythm: Normal rate and regular rhythm.      Pulses:           Radial pulses are 2+ on the right side and 2+ on the left side.        Posterior tibial pulses are 2+ on the right " side and 2+ on the left side.      Heart sounds: Normal heart sounds. No murmur heard.    No friction rub. No gallop.   Pulmonary:      Effort: Pulmonary effort is normal. No respiratory distress.      Breath sounds: Normal breath sounds. No wheezing or rales.   Abdominal:      General: Bowel sounds are normal.      Palpations: Abdomen is soft.   Musculoskeletal:         General: No swelling. Normal range of motion.      Cervical back: Neck supple.   Skin:     General: Skin is warm and dry.      Capillary Refill: Capillary refill takes less than 2 seconds.      Findings: No rash.   Neurological:      Mental Status: She is alert and oriented to person, place, and time.   Psychiatric:         Speech: Speech normal.         Behavior: Behavior normal.         Thought Content: Thought content normal.         Judgment: Judgment normal.         Procedure     ECG 12 Lead    Date/Time: 5/1/2023 12:59 AM  Performed by: Edmar Mei APRN  Authorized by: Edmar Mei APRN   Comparison: compared with previous ECG from 5/18/2020  Similar to previous ECG  Rhythm: sinus rhythm  Rate: normal  BPM: 74  QRS axis: normal  Comments: QTc 410 ms  No acute changes               Assessment & Plan    Diagnosis Plan   1. Chest pain, unspecified type  isosorbide mononitrate (IMDUR) 30 MG 24 hr tablet      2. Shortness of breath        3. Essential hypertension        4. Palpitations        PLAN  1. The patient's complains of dyspnea and chest pain under her left breast with tightness. She has undergone a left heart catheterization in 07/2020.  The left heart cath showed normal coronary arteries with a preserved ejection fraction.  We will start patient on isosorbide for antianginal therapy.  2. The patient states she feels the palpitations about once every 3 to 4 days.  She is being treated with metoprolol.  We will continue this without any change.  3. She complains of experiencing dizziness anytime she goes to stand up.  She was  advised to change positions slowly, and drink plenty of fluids.  4.  Informed of signs and symptoms of ACS and advised to seek emergent treatment for any new worsening symptoms.  Patient also advised sooner follow-up as needed.  Also advised to follow-up with family doctor as needed  This note is dictated utilizing voice recognition software.  Although this record has been proof read, transcriptional errors may still be present. If questions occur regarding the content of this record please do not hesitate to call our office.  I have reviewed and confirmed the accuracy of the ROS as documented by the MA/LPN/RN BETZAIDA Blas    Assessment  1. Dyspnea on exertion  2. Palpitations  3. Dizziness  4. Hypertension    Return in about 1 year (around 4/28/2024), or if symptoms worsen or fail to improve.    Diagnoses and all orders for this visit:    1. Chest pain, unspecified type (Primary)  -     isosorbide mononitrate (IMDUR) 30 MG 24 hr tablet; Take 1 tablet by mouth Every Morning.  Dispense: 90 tablet; Refill: 3    2. Shortness of breath    3. Essential hypertension    4. Palpitations    Other orders  -     Cancel: ECG 12 Lead  -     ECG 12 Lead        Arlette Felder  reports that she has never smoked. She has never used smokeless tobacco.. I have educated her on the risk of diseases from using tobacco products.     Advance Care Planning   ACP discussion was held with the patient during this visit. Patient does not have an advance directive, declines further assistance.          MEDS ORDERED DURING VISIT:  New Medications Ordered This Visit   Medications   • isosorbide mononitrate (IMDUR) 30 MG 24 hr tablet     Sig: Take 1 tablet by mouth Every Morning.     Dispense:  90 tablet     Refill:  3           This document has been electronically signed by BETZAIDA Blas Jr.  May 1, 2023 01:01 EDT    Transcribed from ambient dictation for BETZAIDA Blas by Asya Lagunas.  04/28/23   13:40 EDT    Patient or  patient representative verbalized consent to the visit recording.  I have personally performed the services described in this document as transcribed by the above individual, and it is both accurate and complete.

## 2023-05-01 PROCEDURE — 93000 ELECTROCARDIOGRAM COMPLETE: CPT | Performed by: NURSE PRACTITIONER

## 2024-05-21 DIAGNOSIS — R07.9 CHEST PAIN, UNSPECIFIED TYPE: ICD-10-CM

## 2024-05-21 RX ORDER — ISOSORBIDE MONONITRATE 30 MG/1
30 TABLET, EXTENDED RELEASE ORAL EVERY MORNING
Qty: 90 TABLET | Refills: 2 | Status: SHIPPED | OUTPATIENT
Start: 2024-05-21

## 2024-12-16 ENCOUNTER — OFFICE VISIT (OUTPATIENT)
Dept: CARDIOLOGY | Facility: CLINIC | Age: 69
End: 2024-12-16
Payer: MEDICARE

## 2024-12-16 VITALS
HEIGHT: 68 IN | WEIGHT: 279.8 LBS | OXYGEN SATURATION: 96 % | DIASTOLIC BLOOD PRESSURE: 93 MMHG | SYSTOLIC BLOOD PRESSURE: 142 MMHG | BODY MASS INDEX: 42.41 KG/M2 | HEART RATE: 97 BPM

## 2024-12-16 DIAGNOSIS — R07.9 CHEST PAIN, UNSPECIFIED TYPE: ICD-10-CM

## 2024-12-16 DIAGNOSIS — R00.2 PALPITATIONS: ICD-10-CM

## 2024-12-16 DIAGNOSIS — I10 ESSENTIAL HYPERTENSION: Primary | ICD-10-CM

## 2024-12-16 PROCEDURE — 3080F DIAST BP >= 90 MM HG: CPT | Performed by: NURSE PRACTITIONER

## 2024-12-16 PROCEDURE — 99214 OFFICE O/P EST MOD 30 MIN: CPT | Performed by: NURSE PRACTITIONER

## 2024-12-16 PROCEDURE — 1160F RVW MEDS BY RX/DR IN RCRD: CPT | Performed by: NURSE PRACTITIONER

## 2024-12-16 PROCEDURE — 1159F MED LIST DOCD IN RCRD: CPT | Performed by: NURSE PRACTITIONER

## 2024-12-16 PROCEDURE — 3077F SYST BP >= 140 MM HG: CPT | Performed by: NURSE PRACTITIONER

## 2024-12-16 PROCEDURE — 93000 ELECTROCARDIOGRAM COMPLETE: CPT | Performed by: NURSE PRACTITIONER

## 2024-12-16 RX ORDER — OLMESARTAN MEDOXOMIL 40 MG/1
40 TABLET ORAL DAILY
Qty: 90 TABLET | Refills: 3 | Status: SHIPPED | OUTPATIENT
Start: 2024-12-16

## 2024-12-16 RX ORDER — PROMETHAZINE HYDROCHLORIDE 25 MG/1
25 TABLET ORAL EVERY 6 HOURS PRN
COMMUNITY

## 2024-12-16 RX ORDER — NITROGLYCERIN 0.4 MG/1
TABLET SUBLINGUAL
Qty: 30 TABLET | Refills: 5 | Status: SHIPPED | OUTPATIENT
Start: 2024-12-16

## 2024-12-16 RX ORDER — CLONAZEPAM 0.5 MG/1
0.5 TABLET ORAL 2 TIMES DAILY PRN
COMMUNITY

## 2024-12-16 RX ORDER — BENZTROPINE MESYLATE 1 MG/1
1 TABLET ORAL 2 TIMES DAILY
COMMUNITY

## 2024-12-16 NOTE — PROGRESS NOTES
Subjective     Arlette Felder is a 69 y.o. female who presents to day for Follow-up and Hypertension.    CHIEF COMPLIANT  Chief Complaint   Patient presents with    Follow-up    Hypertension       Active Problems:  Problem List Items Addressed This Visit          Cardiac and Vasculature    Essential hypertension - Primary    Relevant Medications    olmesartan (BENICAR) 40 MG tablet    Chest pain     Other Visit Diagnoses       Palpitations            Problem list  1.  Chest pain  1.1 left heart catheterization 7/20: Left main normal, circumflex normal, LAD normal, RCA normal, EF 65%, 16-18 LVEDP.  2.  Shortness of breath  2.1 echocardiogram 6/20: LV wall thickness in the upper limits of normal, grade 1 diastolic dysfunction, trivial MR and TR, pulmonary artery pressures of 30 to 35 mmHg, EF 56 to 60%  3.  Palpitations  4.  Lower extremity edema    HPI  HPI  Ms. Arlette Jacob is a 69-year-old female patient being followed up today for chronic arterial hypertension and chest pain.    Patient does have a history of chronic arterial hypertension in which she is being treated with amlodipine, metoprolol, and lisinopril.  Today her blood pressure is elevated 142/93 heart rate of 97.  On recheck her blood pressure had returned to 130/89.  She does have some associated dizziness if she bends over.  Says at times she does get headaches still which would make her believe that her blood pressure is elevated on occasion.    Patient does report some chest pain that occurs intermittently in her chest.  Says this is the same as what has been ever since she had a heart cath back in 2020.  Not really changed at all.  It can go every week then go months in between.  No obvious aggravating factors.  She says it sort of feels like a squeezing of her heart when it does happen.  It does not radiate anywhere from her mid sternum.  It can last up to 3 to 4 minutes.  She does have associated dizziness.  She says that it is quite significant  when it does happen.  She does report that nitroglycerin can help.    Patient does have palpitations which she has intermittent fluttering like sensation that occurs in her chest.  Once again she describes as occurring on a weekly basis and can go months in between episodes.    Patient does have some intermittent lower extremity edema which mainly occurs in her feet and ankles.    Patient denies any shortness of breath,syncope, PND, orthopnea, or strokelike symptoms.  PRIOR MEDS  Current Outpatient Medications on File Prior to Visit   Medication Sig Dispense Refill    amitriptyline (ELAVIL) 150 MG tablet Take 1 tablet by mouth Every Night.      amLODIPine (NORVASC) 10 MG tablet TAKE 1 TABLET BY MOUTH DAILY. 90 tablet 1    benztropine (COGENTIN) 1 MG tablet Take 1 tablet by mouth 2 (Two) Times a Day.      clonazePAM (KlonoPIN) 0.5 MG tablet Take 1 tablet by mouth 2 (Two) Times a Day As Needed.      diclofenac (VOLTAREN) 75 MG EC tablet Take 1 tablet by mouth 2 (Two) Times a Day.      fluticasone (FLONASE) 50 MCG/ACT nasal spray Administer 1 spray into the nostril(s) as directed by provider Daily.      gabapentin (NEURONTIN) 100 MG capsule Take 1 capsule by mouth 2 (Two) Times a Day.      hydrOXYzine (ATARAX) 50 MG tablet Take 1 tablet by mouth 3 (Three) Times a Day As Needed for Itching.      isosorbide mononitrate (IMDUR) 30 MG 24 hr tablet TAKE 1 TABLET BY MOUTH EVERY MORNING. 90 tablet 2    metoprolol tartrate (LOPRESSOR) 100 MG tablet Take 1 tablet by mouth 2 (Two) Times a Day.      nitroglycerin (NITROSTAT) 0.4 MG SL tablet 1 under the tongue as needed for angina, may repeat q5mins for up three doses 30 tablet 5    promethazine (PHENERGAN) 25 MG tablet Take 1 tablet by mouth Every 6 (Six) Hours As Needed for Nausea or Vomiting.      TiZANidine (ZANAFLEX) 4 MG capsule Take 1 capsule by mouth 3 (Three) Times a Day.      traMADol (ULTRAM) 50 MG tablet Take 1 tablet by mouth 2 (Two) Times a Day As Needed for  Moderate Pain.      venlafaxine (EFFEXOR) 75 MG tablet Take 1 tablet by mouth Daily.      ziprasidone (GEODON) 40 MG capsule Take 1 capsule by mouth Daily.      [DISCONTINUED] lisinopril (PRINIVIL,ZESTRIL) 40 MG tablet TAKE 1 TABLET BY MOUTH DAILY. 90 tablet 1    [DISCONTINUED] rosuvastatin (CRESTOR) 20 MG tablet Take 1 tablet by mouth Daily.       No current facility-administered medications on file prior to visit.       ALLERGIES  Sulfa antibiotics    HISTORY  Past Medical History:   Diagnosis Date    Acid reflux     Anxiety     Asthma     Bipolar disorder     Chronic pain disorder     back pain    Depression     Environmental allergies     Hyperlipidemia     Hypertension     Insomnia     Rheumatic fever     Suicidal thoughts     Suicide attempt        Social History     Socioeconomic History    Marital status:    Tobacco Use    Smoking status: Never    Smokeless tobacco: Never   Substance and Sexual Activity    Alcohol use: No    Drug use: No     Comment: reports taking only prescribed medications    Sexual activity: Never       Family History   Problem Relation Age of Onset    Bipolar disorder Paternal Grandfather     Anxiety disorder Mother     Depression Mother     Heart disease Mother     Hypertension Mother     Alcohol abuse Father     Heart disease Father     Hypertension Father     No Known Problems Sister        Review of Systems   Constitutional:  Positive for fatigue. Negative for chills and fever.   HENT:  Negative for congestion, rhinorrhea and sore throat.    Eyes:  Negative for visual disturbance.   Respiratory:  Positive for chest tightness. Negative for apnea and shortness of breath.    Cardiovascular:  Positive for chest pain (dull no radiation does not happen often), palpitations (flutters) and leg swelling (feet and ankle).   Gastrointestinal:  Negative for constipation, diarrhea and nausea.   Musculoskeletal:  Positive for arthralgias and back pain. Negative for neck pain.   Skin:   "Negative for rash and wound.   Allergic/Immunologic: Positive for environmental allergies. Negative for food allergies.   Neurological:  Positive for dizziness (bending over), weakness and light-headedness. Negative for syncope.   Hematological:  Does not bruise/bleed easily.   Psychiatric/Behavioral:  Negative for sleep disturbance.        Objective     VITALS: /93 (BP Location: Left arm, Patient Position: Sitting, Cuff Size: Adult)   Pulse 97   Ht 172.7 cm (68\")   Wt 127 kg (279 lb 12.8 oz)   SpO2 96%   BMI 42.54 kg/m²     LABS:   Lab Results (most recent)       None            IMAGING:   No Images in the past 120 days found..    EXAM:  Physical Exam  Vitals and nursing note reviewed.   Constitutional:       Appearance: She is well-developed.   HENT:      Head: Normocephalic.   Neck:      Thyroid: No thyroid mass.      Vascular: No carotid bruit or JVD.      Trachea: Trachea and phonation normal.   Cardiovascular:      Rate and Rhythm: Normal rate and regular rhythm.      Pulses:           Radial pulses are 2+ on the right side and 2+ on the left side.        Posterior tibial pulses are 2+ on the right side and 2+ on the left side.      Heart sounds: Normal heart sounds. No murmur heard.     No friction rub. No gallop.   Pulmonary:      Effort: Pulmonary effort is normal. No respiratory distress.      Breath sounds: Normal breath sounds. No wheezing or rales.   Musculoskeletal:         General: No swelling. Normal range of motion.      Cervical back: Neck supple.   Skin:     General: Skin is warm and dry.      Capillary Refill: Capillary refill takes less than 2 seconds.      Findings: No rash.   Neurological:      Mental Status: She is alert and oriented to person, place, and time.   Psychiatric:         Speech: Speech normal.         Behavior: Behavior normal.         Thought Content: Thought content normal.         Judgment: Judgment normal.         Procedure     ECG 12 Lead    Date/Time: 12/16/2024 " 2:43 PM  Performed by: Edmar Mei APRN    Authorized by: Edmar Mei APRN  Comparison: compared with previous ECG from 5/1/2023  Similar to previous ECG  Rhythm: sinus rhythm  Rate: normal  BPM: 81  QRS axis: normal  Other findings: non-specific ST-T wave changes  Comments: QTc 429 ms  No acute changes             Assessment & Plan    Diagnosis Plan   1. Essential hypertension  olmesartan (BENICAR) 40 MG tablet      2. Chest pain, unspecified type        3. Palpitations        1.  Due to patient's elevated blood pressures with symptomology of dizziness and headaches at times.  Will switch her lisinopril to olmesartan.  She will return a log in 1 to 2 weeks.  Goal blood pressure 130s over 80s was discussed.    2.  Patient does have chronic chest pain that is unchanged from her previous heart cath that identified normal coronary arteries.  We did discuss this in detail.  She experiences any changes in her pain in regards to frequency duration or characteristics she is to notify us we will repeat ischemic workup at that time.    3.  Patient does have palpitations which occur intermittently in her chest.  Usually short-lived and vary in duration.  We will continue to monitor at this time.    4.  Informed of signs and symptoms of ACS and advised to seek emergent treatment for any new worsening symptoms.  Patient also advised sooner follow-up as needed.  Also advised to follow-up with family doctor as needed  This note is dictated utilizing voice recognition software.  Although this record has been proof read, transcriptional errors may still be present. If questions occur regarding the content of this record please do not hesitate to call our office.  I have reviewed and confirmed the accuracy of the ROS as documented by the MA/LPN/RN BETZAIDA Blas    No follow-ups on file.    Diagnoses and all orders for this visit:    1. Essential hypertension (Primary)  -     olmesartan (BENICAR) 40 MG tablet; Take 1  tablet by mouth Daily.  Dispense: 90 tablet; Refill: 3    2. Chest pain, unspecified type    3. Palpitations    Other orders  -     ECG 12 Lead        Arlette Felder  reports that she has never smoked. She has never used smokeless tobacco. I have educated her on the risk of diseases from using tobacco products. Patient does not smoke.          Class 3 Severe Obesity (BMI >=40). Obesity-related health conditions include the following: hypertension. . We discussed portion control and increasing exercise.    Advance Care Planning   ACP discussion was held with the patient during this visit. Patient does not have an advance directive, declines further assistance.           MEDS ORDERED DURING VISIT:  New Medications Ordered This Visit   Medications    olmesartan (BENICAR) 40 MG tablet     Sig: Take 1 tablet by mouth Daily.     Dispense:  90 tablet     Refill:  3           This document has been electronically signed by Edmar Mei Jr., APRTOYIN  December 16, 2024 14:55 EST

## 2025-01-03 ENCOUNTER — TELEPHONE (OUTPATIENT)
Dept: CARDIOLOGY | Facility: CLINIC | Age: 70
End: 2025-01-03
Payer: MEDICARE

## 2025-01-03 NOTE — TELEPHONE ENCOUNTER
PT CALLED THE OFFICE AND LVM ON THE TRIAGE LINE REQUESTING TO D/C THE OLMESARTAN AND RESTART LISINOPRIL.  PT STATES SHE GAVE IT A WEEK AND SHE DOESN'T FEEL WELL TAKING THIS MEDICATION.      Called the pt to obtain additional information.  She report dizziness, weak, & stomach cramps since starting the Olmesartan.  Pt doesn't have any way to access her B/P.

## 2025-01-03 NOTE — TELEPHONE ENCOUNTER
Called and notified pt. She verbalized an understanding.  She will call our office back Monday to provide the Lisinopril dosage.  Benicar removed from the pt's medication list.

## 2025-01-07 RX ORDER — LISINOPRIL 40 MG/1
40 TABLET ORAL DAILY
COMMUNITY
Start: 2024-12-18

## 2025-06-16 ENCOUNTER — TELEPHONE (OUTPATIENT)
Dept: CARDIOLOGY | Facility: CLINIC | Age: 70
End: 2025-06-16

## 2025-06-16 NOTE — TELEPHONE ENCOUNTER
Caller: Arlette Felder    Relationship to patient: Self    Best call back number: [phone]    Chief complaint:     Type of visit: FOLLOW UP    Requested date: 7-9-25 IF POSSIBLE     If rescheduling, when is the original appointment: 6-16-25     Additional notes: PATIENT IS NEED TO BE RESCHEDULED FROM 6-16-25. NO AVAILABILITY UNTIL OCTOBER. PLEASE REACH OUT.